# Patient Record
Sex: FEMALE | Race: WHITE | Employment: PART TIME | URBAN - METROPOLITAN AREA
[De-identification: names, ages, dates, MRNs, and addresses within clinical notes are randomized per-mention and may not be internally consistent; named-entity substitution may affect disease eponyms.]

---

## 2018-04-27 ENCOUNTER — TELEPHONE (OUTPATIENT)
Dept: NEUROLOGY | Facility: CLINIC | Age: 53
End: 2018-04-27

## 2018-04-27 NOTE — TELEPHONE ENCOUNTER
Discussed with pt appt made in error. Explained most likely it was clerical error with staff person choosing the wrong pt. She did not understand why we had her  and all other information.   Explained she was seen in [facility] in 2016 and our records

## 2019-10-24 ENCOUNTER — TELEPHONE (OUTPATIENT)
Dept: FAMILY MEDICINE CLINIC | Facility: CLINIC | Age: 54
End: 2019-10-24

## 2019-11-01 ENCOUNTER — LAB ENCOUNTER (OUTPATIENT)
Dept: LAB | Facility: REFERENCE LAB | Age: 54
End: 2019-11-01
Attending: FAMILY MEDICINE
Payer: COMMERCIAL

## 2019-11-01 ENCOUNTER — OFFICE VISIT (OUTPATIENT)
Dept: FAMILY MEDICINE CLINIC | Facility: CLINIC | Age: 54
End: 2019-11-01
Payer: COMMERCIAL

## 2019-11-01 ENCOUNTER — HOSPITAL ENCOUNTER (OUTPATIENT)
Dept: GENERAL RADIOLOGY | Age: 54
Discharge: HOME | End: 2019-11-01
Attending: FAMILY MEDICINE
Payer: COMMERCIAL

## 2019-11-01 VITALS
BODY MASS INDEX: 28.52 KG/M2 | DIASTOLIC BLOOD PRESSURE: 72 MMHG | HEIGHT: 62 IN | WEIGHT: 155 LBS | TEMPERATURE: 98 F | SYSTOLIC BLOOD PRESSURE: 122 MMHG

## 2019-11-01 DIAGNOSIS — B00.1 HERPES LABIALIS: ICD-10-CM

## 2019-11-01 DIAGNOSIS — G89.29 CHRONIC BILATERAL LOW BACK PAIN WITH BILATERAL SCIATICA: ICD-10-CM

## 2019-11-01 DIAGNOSIS — M54.41 CHRONIC BILATERAL LOW BACK PAIN WITH BILATERAL SCIATICA: ICD-10-CM

## 2019-11-01 DIAGNOSIS — M54.42 CHRONIC BILATERAL LOW BACK PAIN WITH BILATERAL SCIATICA: ICD-10-CM

## 2019-11-01 DIAGNOSIS — Z00.00 WELLNESS EXAMINATION: Primary | ICD-10-CM

## 2019-11-01 DIAGNOSIS — R23.4 BREAST SKIN CHANGES: ICD-10-CM

## 2019-11-01 DIAGNOSIS — D50.9 IRON DEFICIENCY ANEMIA, UNSPECIFIED IRON DEFICIENCY ANEMIA TYPE: ICD-10-CM

## 2019-11-01 DIAGNOSIS — Z00.00 WELLNESS EXAMINATION: ICD-10-CM

## 2019-11-01 PROCEDURE — 36415 COLL VENOUS BLD VENIPUNCTURE: CPT

## 2019-11-01 PROCEDURE — 72110 X-RAY EXAM L-2 SPINE 4/>VWS: CPT | Performed by: FAMILY MEDICINE

## 2019-11-01 PROCEDURE — 81001 URINALYSIS AUTO W/SCOPE: CPT

## 2019-11-01 PROCEDURE — 80061 LIPID PANEL: CPT

## 2019-11-01 PROCEDURE — 85025 COMPLETE CBC W/AUTO DIFF WBC: CPT

## 2019-11-01 PROCEDURE — 99396 PREV VISIT EST AGE 40-64: CPT | Performed by: FAMILY MEDICINE

## 2019-11-01 PROCEDURE — 80053 COMPREHEN METABOLIC PANEL: CPT

## 2019-11-01 RX ORDER — VALACYCLOVIR HYDROCHLORIDE 1 G/1
TABLET, FILM COATED ORAL
Qty: 21 TABLET | Refills: 0 | Status: SHIPPED | OUTPATIENT
Start: 2019-11-01 | End: 2020-03-20

## 2019-11-01 NOTE — PROGRESS NOTES
Pt lost weight since last year  She had a trip to [address] [place] [address],[address] with family in [place] and saw 3 aunts. No new family history. No new family history save aunt with dementia. 1 first cousin with leukemia. Her mom is one of 12 children.   SH:  PT IS EVELINA activity:        Days per week: Not on file        Minutes per session: Not on file      Stress: Not on file    Relationships      Social connections:        Talks on phone: Not on file        Gets together: Not on file        Attends [employer] service: No toe walk normal, strength, sensory, DTRs normal bilateral lower extremities         ASSESSMENT/PLAN:   Wellness examination  (primary encounter diagnosis)  Iron deficiency anemia, unspecified iron deficiency anemia type  Chronic bilateral low back pain wit

## 2019-11-01 NOTE — PATIENT INSTRUCTIONS
Exercise 60 minutes daily including balance exercises.   Core exercises every am.    Jeferson lilly  Look at right breast with squeezing waist (contracts pectoralis)

## 2019-11-04 ENCOUNTER — TELEPHONE (OUTPATIENT)
Dept: FAMILY MEDICINE CLINIC | Facility: CLINIC | Age: 54
End: 2019-11-04

## 2019-11-04 DIAGNOSIS — R31.29 MICROSCOPIC HEMATURIA: Primary | ICD-10-CM

## 2019-11-04 NOTE — TELEPHONE ENCOUNTER
Results of x-ray and blood tests conveyed to patient. She should be doing core exercises daily. Patient is interested in physical therapy. Will order after results of MRI obtained. Repeat UA micro secondary to small amount of blood in urine.   Patient w

## 2019-11-04 NOTE — TELEPHONE ENCOUNTER
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   Pt to increase fiber, exercise and take fish oil.  postiive small blood in urine,

## 2019-11-08 ENCOUNTER — APPOINTMENT (OUTPATIENT)
Dept: LAB | Facility: REFERENCE LAB | Age: 54
End: 2019-11-08
Attending: FAMILY MEDICINE
Payer: COMMERCIAL

## 2019-11-08 DIAGNOSIS — R31.29 MICROSCOPIC HEMATURIA: ICD-10-CM

## 2019-11-08 PROCEDURE — 81003 URINALYSIS AUTO W/O SCOPE: CPT

## 2019-11-21 ENCOUNTER — HOSPITAL ENCOUNTER (OUTPATIENT)
Dept: MRI IMAGING | Facility: HOSPITAL | Age: 54
Discharge: HOME | End: 2019-11-21
Attending: FAMILY MEDICINE
Payer: COMMERCIAL

## 2019-11-21 DIAGNOSIS — G89.29 CHRONIC BILATERAL LOW BACK PAIN WITH BILATERAL SCIATICA: ICD-10-CM

## 2019-11-21 DIAGNOSIS — M54.41 CHRONIC BILATERAL LOW BACK PAIN WITH BILATERAL SCIATICA: ICD-10-CM

## 2019-11-21 DIAGNOSIS — M54.42 CHRONIC BILATERAL LOW BACK PAIN WITH BILATERAL SCIATICA: ICD-10-CM

## 2019-11-21 PROCEDURE — 72148 MRI LUMBAR SPINE W/O DYE: CPT | Performed by: FAMILY MEDICINE

## 2019-11-26 ENCOUNTER — TELEPHONE (OUTPATIENT)
Dept: FAMILY MEDICINE CLINIC | Facility: CLINIC | Age: 54
End: 2019-11-26

## 2019-11-26 NOTE — TELEPHONE ENCOUNTER
MRI reveals diffuse disc bulge at L5-S1 with mild right neuroforaminal stenosis, lesser so left neuroforaminal stenosis. Discussed with patient. Will try physical therapy for core stabilization and pain control.   If not better or any worse we will consid

## 2020-03-06 ENCOUNTER — TELEPHONE (OUTPATIENT)
Dept: FAMILY MEDICINE CLINIC | Facility: CLINIC | Age: 55
End: 2020-03-06

## 2020-03-06 NOTE — TELEPHONE ENCOUNTER
Pt stating she started to have shoulder blade pain yesterday and last night she needed her husbands help to get into her PJ's due to the pain.   Today pt took 2 Aleve and that didn't help as much, then she used a heating pad and that did help some then she

## 2020-03-20 ENCOUNTER — LAB ENCOUNTER (OUTPATIENT)
Dept: LAB | Facility: REFERENCE LAB | Age: 55
End: 2020-03-20
Attending: FAMILY MEDICINE
Payer: COMMERCIAL

## 2020-03-20 ENCOUNTER — TELEPHONE (OUTPATIENT)
Dept: FAMILY MEDICINE CLINIC | Facility: CLINIC | Age: 55
End: 2020-03-20

## 2020-03-20 ENCOUNTER — HOSPITAL ENCOUNTER (OUTPATIENT)
Dept: GENERAL RADIOLOGY | Age: 55
Discharge: HOME | End: 2020-03-20
Attending: FAMILY MEDICINE
Payer: COMMERCIAL

## 2020-03-20 ENCOUNTER — OFFICE VISIT (OUTPATIENT)
Dept: FAMILY MEDICINE CLINIC | Facility: CLINIC | Age: 55
End: 2020-03-20
Payer: COMMERCIAL

## 2020-03-20 VITALS
HEART RATE: 84 BPM | OXYGEN SATURATION: 99 % | TEMPERATURE: 98 F | HEIGHT: 62 IN | BODY MASS INDEX: 28.89 KG/M2 | DIASTOLIC BLOOD PRESSURE: 76 MMHG | SYSTOLIC BLOOD PRESSURE: 124 MMHG | WEIGHT: 157 LBS

## 2020-03-20 DIAGNOSIS — D64.9 ANEMIA, UNSPECIFIED TYPE: ICD-10-CM

## 2020-03-20 DIAGNOSIS — L59.0: ICD-10-CM

## 2020-03-20 DIAGNOSIS — R59.0 SUPRACLAVICULAR ADENOPATHY: ICD-10-CM

## 2020-03-20 DIAGNOSIS — D64.9 ANEMIA, UNSPECIFIED TYPE: Primary | ICD-10-CM

## 2020-03-20 DIAGNOSIS — E55.9 VITAMIN D DEFICIENCY: ICD-10-CM

## 2020-03-20 DIAGNOSIS — R53.83 MALAISE AND FATIGUE: ICD-10-CM

## 2020-03-20 DIAGNOSIS — R53.81 MALAISE AND FATIGUE: ICD-10-CM

## 2020-03-20 DIAGNOSIS — M25.50 ARTHRALGIA, UNSPECIFIED JOINT: ICD-10-CM

## 2020-03-20 DIAGNOSIS — D75.89 MACROCYTOSIS: ICD-10-CM

## 2020-03-20 DIAGNOSIS — S04.892A: Primary | ICD-10-CM

## 2020-03-20 LAB
25(OH)D3 SERPL-MCNC: 34.3 NG/ML (ref 30–100)
ALBUMIN SERPL-MCNC: 3.4 G/DL (ref 3.4–5)
ALBUMIN/GLOB SERPL: 1 {RATIO} (ref 1–2)
ALP LIVER SERPL-CCNC: 83 U/L (ref 41–108)
ALT SERPL-CCNC: 19 U/L (ref 13–56)
ANION GAP SERPL CALC-SCNC: 5 MMOL/L (ref 0–18)
AST SERPL-CCNC: 17 U/L (ref 15–37)
BASOPHILS # BLD AUTO: 0.05 X10(3) UL (ref 0–0.2)
BASOPHILS NFR BLD AUTO: 0.9 %
BILIRUB SERPL-MCNC: 1.1 MG/DL (ref 0.1–2)
BUN BLD-MCNC: 15 MG/DL (ref 7–18)
BUN/CREAT SERPL: 17.6 (ref 10–20)
CALCIUM BLD-MCNC: 8.8 MG/DL (ref 8.5–10.1)
CHLORIDE SERPL-SCNC: 108 MMOL/L (ref 98–112)
CO2 SERPL-SCNC: 27 MMOL/L (ref 21–32)
CREAT BLD-MCNC: 0.85 MG/DL (ref 0.55–1.02)
CRP SERPL-MCNC: 0.38 MG/DL (ref ?–0.3)
DEPRECATED RDW RBC AUTO: 70.3 FL (ref 35.1–46.3)
EOSINOPHIL # BLD AUTO: 0.15 X10(3) UL (ref 0–0.7)
EOSINOPHIL NFR BLD AUTO: 2.7 %
ERYTHROCYTE [DISTWIDTH] IN BLOOD BY AUTOMATED COUNT: 19.8 % (ref 11–15)
ERYTHROCYTE [SEDIMENTATION RATE] IN BLOOD: 36 MM/HR (ref 0–30)
FOLATE SERPL-MCNC: 13.3 NG/ML (ref 8.7–?)
GLOBULIN PLAS-MCNC: 3.3 G/DL (ref 2.8–4.4)
GLUCOSE BLD-MCNC: 99 MG/DL (ref 70–99)
HCT VFR BLD AUTO: 24.5 % (ref 35–48)
HGB BLD-MCNC: 8.5 G/DL (ref 12–16)
IMM GRANULOCYTES # BLD AUTO: 0.04 X10(3) UL (ref 0–1)
IMM GRANULOCYTES NFR BLD: 0.7 %
IRON SATURATION: 81 % (ref 15–50)
IRON SERPL-MCNC: 183 UG/DL (ref 50–170)
LYMPHOCYTES # BLD AUTO: 1.52 X10(3) UL (ref 1–4)
LYMPHOCYTES NFR BLD AUTO: 27.8 %
M PROTEIN MFR SERPL ELPH: 6.7 G/DL (ref 6.4–8.2)
MCH RBC QN AUTO: 35.1 PG (ref 26–34)
MCHC RBC AUTO-ENTMCNC: 34.7 G/DL (ref 31–37)
MCV RBC AUTO: 101.2 FL (ref 80–100)
MONOCYTES # BLD AUTO: 0.47 X10(3) UL (ref 0.1–1)
MONOCYTES NFR BLD AUTO: 8.6 %
NEUTROPHILS # BLD AUTO: 3.23 X10 (3) UL (ref 1.5–7.7)
NEUTROPHILS # BLD AUTO: 3.23 X10(3) UL (ref 1.5–7.7)
NEUTROPHILS NFR BLD AUTO: 59.3 %
OSMOLALITY SERPL CALC.SUM OF ELEC: 291 MOSM/KG (ref 275–295)
PATIENT FASTING Y/N/NP: NO
PLATELET # BLD AUTO: 215 10(3)UL (ref 150–450)
PLATELET MORPHOLOGY: NORMAL
POTASSIUM SERPL-SCNC: 4.3 MMOL/L (ref 3.5–5.1)
RBC # BLD AUTO: 2.42 X10(6)UL (ref 3.8–5.3)
SODIUM SERPL-SCNC: 140 MMOL/L (ref 136–145)
T4 FREE SERPL-MCNC: 1.1 NG/DL (ref 0.8–1.7)
TOTAL IRON BINDING CAPACITY: 226 UG/DL (ref 240–450)
TRANSFERRIN SERPL-MCNC: 152 MG/DL (ref 200–360)
TSI SER-ACNC: 1.48 MIU/ML (ref 0.36–3.74)
VIT B12 SERPL-MCNC: 864 PG/ML (ref 193–986)
WBC # BLD AUTO: 5.5 X10(3) UL (ref 4–11)

## 2020-03-20 PROCEDURE — 80053 COMPREHEN METABOLIC PANEL: CPT

## 2020-03-20 PROCEDURE — 99214 OFFICE O/P EST MOD 30 MIN: CPT | Performed by: FAMILY MEDICINE

## 2020-03-20 PROCEDURE — 36415 COLL VENOUS BLD VENIPUNCTURE: CPT

## 2020-03-20 PROCEDURE — 82746 ASSAY OF FOLIC ACID SERUM: CPT

## 2020-03-20 PROCEDURE — 86038 ANTINUCLEAR ANTIBODIES: CPT

## 2020-03-20 PROCEDURE — 84466 ASSAY OF TRANSFERRIN: CPT

## 2020-03-20 PROCEDURE — 84443 ASSAY THYROID STIM HORMONE: CPT

## 2020-03-20 PROCEDURE — 82306 VITAMIN D 25 HYDROXY: CPT

## 2020-03-20 PROCEDURE — 71046 X-RAY EXAM CHEST 2 VIEWS: CPT | Performed by: FAMILY MEDICINE

## 2020-03-20 PROCEDURE — 85652 RBC SED RATE AUTOMATED: CPT

## 2020-03-20 PROCEDURE — 86140 C-REACTIVE PROTEIN: CPT

## 2020-03-20 PROCEDURE — 84439 ASSAY OF FREE THYROXINE: CPT

## 2020-03-20 PROCEDURE — 85025 COMPLETE CBC W/AUTO DIFF WBC: CPT

## 2020-03-20 PROCEDURE — 83540 ASSAY OF IRON: CPT

## 2020-03-20 PROCEDURE — 82607 VITAMIN B-12: CPT

## 2020-03-20 RX ORDER — CHLORAL HYDRATE 500 MG
CAPSULE ORAL
COMMUNITY
End: 2021-08-20

## 2020-03-20 RX ORDER — OMEPRAZOLE 40 MG/1
40 CAPSULE, DELAYED RELEASE ORAL DAILY
Qty: 90 CAPSULE | Refills: 3 | Status: SHIPPED | OUTPATIENT
Start: 2020-03-20 | End: 2020-07-30

## 2020-03-20 RX ORDER — MULTIVITAMIN
TABLET ORAL
COMMUNITY
End: 2020-07-30

## 2020-03-20 RX ORDER — MECLIZINE HCL 12.5 MG/1
TABLET ORAL
COMMUNITY
End: 2020-07-30

## 2020-03-20 RX ORDER — ALPRAZOLAM 0.5 MG/1
0.5 TABLET ORAL EVERY 6 HOURS PRN
Qty: 10 TABLET | Refills: 0 | Status: SHIPPED | OUTPATIENT
Start: 2020-03-20 | End: 2020-07-30

## 2020-03-20 NOTE — PROGRESS NOTES
HPI:    Patient ID: Rosa Mendoza is a 48year old female who presents for exhaustion beginning  Late Nov 2019, and left sided neck, shoulder, inbetween shoulders, midback pain, and swollen lymph nodes left side of neck.   No problems smelling tasting or he depressed.   TMs/EACs normal  Neck supple with shotty anterior and posterior cervical adenopathy, nontender  Positive shoddy left supraclavicular adenopathy  Heart regular rate and rhythm without murmur  Lungs clear to auscultation  Abdomen soft without vis 0     Sig: Take 1 tablet (0.5 mg total) by mouth every 6 (six) hours as needed.        Imaging & Referrals:  MRI BRAIN (W+WO) (CPT=70553)  XR CHEST PA + LAT CHEST (FSQ=63530)       QUIN CARNES,     This note was partially prepared using [facility]

## 2020-03-20 NOTE — TELEPHONE ENCOUNTER
Spoke with patient via phone  Chest x-ray normal  Compact normal  CBC significant for new macrocytic anemia  We will start proton pump inhibitor secondary to signs of early satiety and will have patient return tomorrow for rectal exam and stool for guaiac.

## 2020-03-21 ENCOUNTER — OFFICE VISIT (OUTPATIENT)
Dept: FAMILY MEDICINE CLINIC | Facility: CLINIC | Age: 55
End: 2020-03-21
Payer: COMMERCIAL

## 2020-03-21 VITALS
SYSTOLIC BLOOD PRESSURE: 124 MMHG | DIASTOLIC BLOOD PRESSURE: 80 MMHG | HEIGHT: 62 IN | WEIGHT: 157 LBS | HEART RATE: 70 BPM | OXYGEN SATURATION: 98 % | TEMPERATURE: 98 F | BODY MASS INDEX: 28.89 KG/M2

## 2020-03-21 DIAGNOSIS — R53.83 MALAISE AND FATIGUE: ICD-10-CM

## 2020-03-21 DIAGNOSIS — R51.9 NOCTURNAL HEADACHES: ICD-10-CM

## 2020-03-21 DIAGNOSIS — R53.81 MALAISE AND FATIGUE: ICD-10-CM

## 2020-03-21 DIAGNOSIS — D53.9 ANEMIA, MACROCYTIC: Primary | ICD-10-CM

## 2020-03-21 DIAGNOSIS — R68.81 EARLY SATIETY: ICD-10-CM

## 2020-03-21 PROCEDURE — 99213 OFFICE O/P EST LOW 20 MIN: CPT | Performed by: FAMILY MEDICINE

## 2020-03-21 NOTE — PROGRESS NOTES
HPI:    Patient ID: Suzi Land is a 48year old female who presents for follow-up of new macrocytic anemia. Patient found to have hemoglobin of 8.5. Hemoglobin was normal November 2019.   Initial lab tests reveal normal iron studies, normal B12, folate Fear of current or ex partner: Not on file        Emotionally abused: Not on file        Physically abused: Not on file        Forced sexual activity: Not on file    Other Topics      Concerns:        Caffeine Concern: Not Asked        Exercise: Not Asked given copy of lab results and referral for hematology  Any worsening weakness patient to go to ER. 2. Nocturnal headaches consistent with sinusitis with postnasal drip  3. Early satiety without weight loss  4.  Malaise and fatigue         Meds This Visit

## 2020-03-23 ENCOUNTER — LAB ENCOUNTER (OUTPATIENT)
Dept: LAB | Facility: REFERENCE LAB | Age: 55
End: 2020-03-23
Attending: FAMILY MEDICINE
Payer: COMMERCIAL

## 2020-03-23 ENCOUNTER — TELEPHONE (OUTPATIENT)
Dept: FAMILY MEDICINE CLINIC | Facility: CLINIC | Age: 55
End: 2020-03-23

## 2020-03-23 ENCOUNTER — TELEPHONE (OUTPATIENT)
Dept: HEMATOLOGY/ONCOLOGY | Facility: HOSPITAL | Age: 55
End: 2020-03-23

## 2020-03-23 DIAGNOSIS — D53.9 ANEMIA, MACROCYTIC: ICD-10-CM

## 2020-03-23 DIAGNOSIS — D64.9 ANEMIA, UNSPECIFIED TYPE: ICD-10-CM

## 2020-03-23 LAB
BASOPHILS # BLD AUTO: 0.05 X10(3) UL (ref 0–0.2)
BASOPHILS NFR BLD AUTO: 1.2 %
DEPRECATED RDW RBC AUTO: 73 FL (ref 35.1–46.3)
EOSINOPHIL # BLD AUTO: 0.15 X10(3) UL (ref 0–0.7)
EOSINOPHIL NFR BLD AUTO: 3.5 %
ERYTHROCYTE [DISTWIDTH] IN BLOOD BY AUTOMATED COUNT: 20 % (ref 11–15)
HCT VFR BLD AUTO: 23.8 % (ref 35–48)
HGB BLD-MCNC: 8.1 G/DL (ref 12–16)
HGB RETIC QN AUTO: 37.8 PG (ref 28.2–36.6)
IMM GRANULOCYTES # BLD AUTO: 0.02 X10(3) UL (ref 0–1)
IMM GRANULOCYTES NFR BLD: 0.5 %
IMM RETICS NFR: 0.27 RATIO (ref 0.1–0.3)
LDH SERPL L TO P-CCNC: 375 U/L
LYMPHOCYTES # BLD AUTO: 1.09 X10(3) UL (ref 1–4)
LYMPHOCYTES NFR BLD AUTO: 25.2 %
MCH RBC QN AUTO: 35.2 PG (ref 26–34)
MCHC RBC AUTO-ENTMCNC: 34 G/DL (ref 31–37)
MCV RBC AUTO: 103.5 FL (ref 80–100)
MONOCYTES # BLD AUTO: 0.42 X10(3) UL (ref 0.1–1)
MONOCYTES NFR BLD AUTO: 9.7 %
NEUTROPHILS # BLD AUTO: 2.6 X10 (3) UL (ref 1.5–7.7)
NEUTROPHILS # BLD AUTO: 2.6 X10(3) UL (ref 1.5–7.7)
NEUTROPHILS NFR BLD AUTO: 59.9 %
NUCLEAR IGG TITR SER IF: NEGATIVE {TITER}
PLATELET # BLD AUTO: 214 10(3)UL (ref 150–450)
PLATELET MORPHOLOGY: NORMAL
RBC # BLD AUTO: 2.3 X10(6)UL (ref 3.8–5.3)
RETICS # AUTO: 382 X10(3) UL (ref 22.5–147.5)
RETICS/RBC NFR AUTO: 16.6 % (ref 0.5–2.5)
WBC # BLD AUTO: 4.3 X10(3) UL (ref 4–11)

## 2020-03-23 PROCEDURE — 83615 LACTATE (LD) (LDH) ENZYME: CPT

## 2020-03-23 PROCEDURE — 85025 COMPLETE CBC W/AUTO DIFF WBC: CPT

## 2020-03-23 PROCEDURE — 85045 AUTOMATED RETICULOCYTE COUNT: CPT

## 2020-03-23 PROCEDURE — 36415 COLL VENOUS BLD VENIPUNCTURE: CPT

## 2020-03-23 NOTE — TELEPHONE ENCOUNTER
Phone call with patient. Patient's hemoglobin is now 8.1 with MCV of 105. Reticulocyte count is appropriate for level of anemia. LDH is elevated. Phoned oncology and they will see her tomorrow. Patient expects a call from oncology for details.   Recomm

## 2020-03-24 ENCOUNTER — APPOINTMENT (OUTPATIENT)
Dept: HEMATOLOGY/ONCOLOGY | Facility: HOSPITAL | Age: 55
End: 2020-03-24
Attending: INTERNAL MEDICINE
Payer: COMMERCIAL

## 2020-03-25 ENCOUNTER — TELEPHONE (OUTPATIENT)
Dept: FAMILY MEDICINE CLINIC | Facility: CLINIC | Age: 55
End: 2020-03-25

## 2020-03-25 NOTE — TELEPHONE ENCOUNTER
Pt saw Dr John Vazquez, she is scheduled for CT neck/chest/abd/pelvis today. Dr John Vazquez is part of [facility],   Will go for MRI of brain. Will f/u with result.   [address]

## 2020-03-26 ENCOUNTER — TELEPHONE (OUTPATIENT)
Dept: FAMILY MEDICINE CLINIC | Facility: CLINIC | Age: 55
End: 2020-03-26

## 2020-03-26 NOTE — TELEPHONE ENCOUNTER
Per pt wanted to verify that care notes and labs results from Dr. Reynolds Pore office is being sent to Dr. Richard Gage. Provided pt with fax # to send in information.

## 2020-03-26 NOTE — TELEPHONE ENCOUNTER
Pt is wondering if ct scan was faxed over from Dr Ashley Pimentel office they told her they would fax it last night pt had ct scan at [facility]

## 2020-03-27 ENCOUNTER — TELEPHONE (OUTPATIENT)
Dept: FAMILY MEDICINE CLINIC | Facility: CLINIC | Age: 55
End: 2020-03-27

## 2020-03-27 DIAGNOSIS — D64.9 ANEMIA, UNSPECIFIED TYPE: Primary | ICD-10-CM

## 2020-03-27 DIAGNOSIS — D75.89 MACROCYTOSIS: ICD-10-CM

## 2020-03-27 NOTE — TELEPHONE ENCOUNTER
Phone call with patient. Patient received information that we had CT result and we do not. Patient told by oncology that she has a thickened stomach wall. She is to make appointment with GI at [facility].   Awaiting CAT scan result for official

## 2020-03-27 NOTE — TELEPHONE ENCOUNTER
CT of neck, abd, chest and pelvis normal save:  Diffuse thickening of distal stomach  Small cervical nodes, too small to require further w/u, unless changes. Pt told to start PPI 4 days ago, still recommend.   To GI, given referral last year for age rela

## 2020-03-27 NOTE — TELEPHONE ENCOUNTER
Patient calling about CT results. Patient aware we do not have CT results but patient does need to talk to Dr. Thiago Miner about what Dr. Bernadette Nicholson told her to talk to Dr. Thiago Miner about.

## 2020-03-28 ENCOUNTER — LAB ENCOUNTER (OUTPATIENT)
Dept: LAB | Facility: REFERENCE LAB | Age: 55
End: 2020-03-28
Attending: FAMILY MEDICINE
Payer: COMMERCIAL

## 2020-03-28 DIAGNOSIS — D75.89 MACROCYTOSIS: ICD-10-CM

## 2020-03-28 DIAGNOSIS — D64.9 ANEMIA, UNSPECIFIED TYPE: ICD-10-CM

## 2020-03-28 LAB
BASOPHILS # BLD AUTO: 0.05 X10(3) UL (ref 0–0.2)
BASOPHILS NFR BLD AUTO: 1.1 %
DEPRECATED RDW RBC AUTO: 76.5 FL (ref 35.1–46.3)
EOSINOPHIL # BLD AUTO: 0.13 X10(3) UL (ref 0–0.7)
EOSINOPHIL NFR BLD AUTO: 2.9 %
ERYTHROCYTE [DISTWIDTH] IN BLOOD BY AUTOMATED COUNT: 19.3 % (ref 11–15)
HCT VFR BLD AUTO: 26.3 % (ref 35–48)
HGB BLD-MCNC: 8.7 G/DL (ref 12–16)
IMM GRANULOCYTES # BLD AUTO: 0.01 X10(3) UL (ref 0–1)
IMM GRANULOCYTES NFR BLD: 0.2 %
LYMPHOCYTES # BLD AUTO: 1.14 X10(3) UL (ref 1–4)
LYMPHOCYTES NFR BLD AUTO: 25.3 %
MCH RBC QN AUTO: 35.8 PG (ref 26–34)
MCHC RBC AUTO-ENTMCNC: 33.1 G/DL (ref 31–37)
MCV RBC AUTO: 108.2 FL (ref 80–100)
MONOCYTES # BLD AUTO: 0.37 X10(3) UL (ref 0.1–1)
MONOCYTES NFR BLD AUTO: 8.2 %
NEUTROPHILS # BLD AUTO: 2.81 X10 (3) UL (ref 1.5–7.7)
NEUTROPHILS # BLD AUTO: 2.81 X10(3) UL (ref 1.5–7.7)
NEUTROPHILS NFR BLD AUTO: 62.3 %
PLATELET # BLD AUTO: 252 10(3)UL (ref 150–450)
PLATELET MORPHOLOGY: NORMAL
RBC # BLD AUTO: 2.43 X10(6)UL (ref 3.8–5.3)
WBC # BLD AUTO: 4.5 X10(3) UL (ref 4–11)

## 2020-03-28 PROCEDURE — 85025 COMPLETE CBC W/AUTO DIFF WBC: CPT

## 2020-03-28 PROCEDURE — 36415 COLL VENOUS BLD VENIPUNCTURE: CPT

## 2020-03-30 ENCOUNTER — TELEPHONE (OUTPATIENT)
Dept: FAMILY MEDICINE CLINIC | Facility: CLINIC | Age: 55
End: 2020-03-30

## 2020-05-14 ENCOUNTER — TELEPHONE (OUTPATIENT)
Dept: FAMILY MEDICINE CLINIC | Facility: CLINIC | Age: 55
End: 2020-05-14

## 2020-05-14 NOTE — TELEPHONE ENCOUNTER
Pt is needing to speak to nurse or Dr Kaycee Sanders about a referral she's needing and didn't want to discuss it with me   Call back

## 2020-05-14 NOTE — TELEPHONE ENCOUNTER
Pt states she likes her current hematologist but would like a second opinion as \"they haven't been able to find out yet what's wrong with me\".  Pt states she is scheduled to see Dr. Scooby Maynard at [facility] on 9/11/2020 but is wondering if [address] [facility] can call Dr. eSjal Peterson Locations     Number of Visits: 1     ________________________________________________________________  Scheduling Information:   To schedule a test at [facility], [employer] Scheduling at [phone], Monday through [place]

## 2020-05-15 ENCOUNTER — TELEPHONE (OUTPATIENT)
Dept: FAMILY MEDICINE CLINIC | Facility: CLINIC | Age: 55
End: 2020-05-15

## 2020-05-15 PROBLEM — D53.9 MACROCYTIC ANEMIA: Status: ACTIVE | Noted: 2020-03-21

## 2020-05-15 NOTE — TELEPHONE ENCOUNTER
Uday Youssef, DO      5/15/20 10:08 AM   Note      Patient is quite upset that she is being charged for a bone marrow biopsy that she never underwent. Overall she feels good without any bleeding from her nose or gums and no weakness.   She is scheduled to

## 2020-05-15 NOTE — TELEPHONE ENCOUNTER
Patient is quite upset that she is being charged for a bone marrow biopsy that she never underwent. Overall she feels good without any bleeding from her nose or gums and no weakness.   She is scheduled to see oncology at [facility] this week and to have her bl

## 2020-06-12 ENCOUNTER — TELEPHONE (OUTPATIENT)
Dept: FAMILY MEDICINE CLINIC | Facility: CLINIC | Age: 55
End: 2020-06-12

## 2020-06-12 NOTE — TELEPHONE ENCOUNTER
Pt inquiring if CT scan from 3/25/2020 was received by [facility]. This RN confirmed it, offered to leave copy for pt for  from office since it can't be sent via [employer]. Pt stated she contacted [facility] who will fax test results to pt. All of pt's questions answered to pt's satisfaction.

## 2020-07-30 ENCOUNTER — OFFICE VISIT (OUTPATIENT)
Dept: FAMILY MEDICINE CLINIC | Facility: CLINIC | Age: 55
End: 2020-07-30
Payer: COMMERCIAL

## 2020-07-30 ENCOUNTER — HOSPITAL ENCOUNTER (OUTPATIENT)
Dept: GENERAL RADIOLOGY | Age: 55
Discharge: HOME | End: 2020-07-30
Attending: FAMILY MEDICINE
Payer: COMMERCIAL

## 2020-07-30 VITALS
WEIGHT: 158 LBS | DIASTOLIC BLOOD PRESSURE: 84 MMHG | HEIGHT: 62 IN | TEMPERATURE: 99 F | OXYGEN SATURATION: 98 % | SYSTOLIC BLOOD PRESSURE: 122 MMHG | HEART RATE: 71 BPM | BODY MASS INDEX: 29.08 KG/M2

## 2020-07-30 DIAGNOSIS — M54.2 NECK PAIN: ICD-10-CM

## 2020-07-30 DIAGNOSIS — G47.00 INSOMNIA, UNSPECIFIED TYPE: ICD-10-CM

## 2020-07-30 DIAGNOSIS — M54.2 NECK PAIN: Primary | ICD-10-CM

## 2020-07-30 DIAGNOSIS — N95.1 HOT FLASHES DUE TO MENOPAUSE: ICD-10-CM

## 2020-07-30 PROCEDURE — 3074F SYST BP LT 130 MM HG: CPT | Performed by: FAMILY MEDICINE

## 2020-07-30 PROCEDURE — 3079F DIAST BP 80-89 MM HG: CPT | Performed by: FAMILY MEDICINE

## 2020-07-30 PROCEDURE — 99213 OFFICE O/P EST LOW 20 MIN: CPT | Performed by: FAMILY MEDICINE

## 2020-07-30 PROCEDURE — 72050 X-RAY EXAM NECK SPINE 4/5VWS: CPT | Performed by: FAMILY MEDICINE

## 2020-07-30 PROCEDURE — 3008F BODY MASS INDEX DOCD: CPT | Performed by: FAMILY MEDICINE

## 2020-07-30 RX ORDER — FOLIC ACID 1 MG/1
1 TABLET ORAL DAILY
COMMUNITY

## 2020-07-30 RX ORDER — ESTRADIOL 0.1 MG/G
CREAM VAGINAL
Qty: 1 TUBE | Refills: 2 | Status: SHIPPED | OUTPATIENT
Start: 2020-07-30 | End: 2020-11-10

## 2020-07-30 RX ORDER — IBUPROFEN 800 MG
TABLET ORAL DAILY
COMMUNITY

## 2020-07-30 RX ORDER — ZOLPIDEM TARTRATE 5 MG/1
5 TABLET ORAL NIGHTLY PRN
Qty: 30 TABLET | Refills: 0 | Status: SHIPPED | OUTPATIENT
Start: 2020-07-30 | End: 2020-11-27

## 2020-07-30 NOTE — PROGRESS NOTES
HPI:    Patient ID: Laura Cameron is a 48year old female who presents ftor anxiety. She has terrible insomnia, associated with menapause. She has hot flashes. during the night. It will wake her up, then she cannot get to sleep.   She takes drops, they he for:  chest pain, palpitations, orthopnea  GI: Negative for:  abdominal pain, nausea, vomiting, diarrhea, constipation, bleeding  Neurology: Negative for: headache, numbness, weakness,   Genito-Urinary: Negative for: dysuria, frequency  Psychiatric: Horace Middleton Prescriptions Disp Refills   • zolpidem 5 MG Oral Tab 30 tablet 0     Sig: Take 1 tablet (5 mg total) by mouth nightly as needed for Sleep. • Estradiol 0.1 MG/GM Vaginal Cream 1 Tube 2     Sig: Insert every night for 7 days week 1.   Week to 6 days out of

## 2020-07-31 ENCOUNTER — TELEPHONE (OUTPATIENT)
Dept: FAMILY MEDICINE CLINIC | Facility: CLINIC | Age: 55
End: 2020-07-31

## 2020-07-31 NOTE — TELEPHONE ENCOUNTER
Discussed results of x-ray of spine with patient. Patient does have anterior spurs that could be contributing to the feeling in the front of her neck. Recommend excellent posture.

## 2020-09-19 DIAGNOSIS — D59.10 AUTOIMMUNE HEMOLYTIC ANEMIA (HCC): Primary | ICD-10-CM

## 2020-09-22 ENCOUNTER — LAB ENCOUNTER (OUTPATIENT)
Dept: LAB | Age: 55
End: 2020-09-22
Attending: FAMILY MEDICINE
Payer: COMMERCIAL

## 2020-09-22 ENCOUNTER — TELEPHONE (OUTPATIENT)
Dept: FAMILY MEDICINE CLINIC | Facility: CLINIC | Age: 55
End: 2020-09-22

## 2020-09-22 DIAGNOSIS — D59.10 AUTOIMMUNE HEMOLYTIC ANEMIA (HCC): ICD-10-CM

## 2020-09-22 LAB
BASOPHILS # BLD AUTO: 0.01 X10(3) UL (ref 0–0.2)
BASOPHILS NFR BLD AUTO: 0.1 %
DEPRECATED RDW RBC AUTO: 51.6 FL (ref 35.1–46.3)
DIRECT COOMBS POLY: NEGATIVE
EOSINOPHIL # BLD AUTO: 0.01 X10(3) UL (ref 0–0.7)
EOSINOPHIL NFR BLD AUTO: 0.1 %
ERYTHROCYTE [DISTWIDTH] IN BLOOD BY AUTOMATED COUNT: 13.2 % (ref 11–15)
HCT VFR BLD AUTO: 39.3 %
HGB BLD-MCNC: 13 G/DL
HGB RETIC QN AUTO: 38.2 PG (ref 28.2–36.6)
IMM GRANULOCYTES # BLD AUTO: 0.02 X10(3) UL (ref 0–1)
IMM GRANULOCYTES NFR BLD: 0.3 %
IMM RETICS NFR: 0.07 RATIO (ref 0.1–0.3)
LDH SERPL L TO P-CCNC: 218 U/L
LYMPHOCYTES # BLD AUTO: 0.66 X10(3) UL (ref 1–4)
LYMPHOCYTES NFR BLD AUTO: 8.6 %
MCH RBC QN AUTO: 34.9 PG (ref 26–34)
MCHC RBC AUTO-ENTMCNC: 33.1 G/DL (ref 31–37)
MCV RBC AUTO: 105.6 FL
MONOCYTES # BLD AUTO: 0.37 X10(3) UL (ref 0.1–1)
MONOCYTES NFR BLD AUTO: 4.8 %
NEUTROPHILS # BLD AUTO: 6.6 X10 (3) UL (ref 1.5–7.7)
NEUTROPHILS # BLD AUTO: 6.6 X10(3) UL (ref 1.5–7.7)
NEUTROPHILS NFR BLD AUTO: 86.1 %
PLATELET # BLD AUTO: 308 10(3)UL (ref 150–450)
RBC # BLD AUTO: 3.72 X10(6)UL
RETICS # AUTO: 97.8 X10(3) UL (ref 22.5–147.5)
RETICS/RBC NFR AUTO: 2.6 %
WBC # BLD AUTO: 7.7 X10(3) UL (ref 4–11)

## 2020-09-22 PROCEDURE — 85045 AUTOMATED RETICULOCYTE COUNT: CPT

## 2020-09-22 PROCEDURE — 36415 COLL VENOUS BLD VENIPUNCTURE: CPT

## 2020-09-22 PROCEDURE — 86880 COOMBS TEST DIRECT: CPT

## 2020-09-22 PROCEDURE — 85025 COMPLETE CBC W/AUTO DIFF WBC: CPT

## 2020-09-22 PROCEDURE — 83615 LACTATE (LD) (LDH) ENZYME: CPT

## 2020-09-22 NOTE — TELEPHONE ENCOUNTER
Called pt and wants test results faxed to provider to titrate prednisone. Informed pt that Dr. Saray Buck needs to review results before faxing to another provider. Pt verbalized understanding.

## 2020-09-23 NOTE — TELEPHONE ENCOUNTER
Devaughn Major, DO  You 15 hours ago (4:36 PM)     Okay to fax results. Message text      Spoke with Kamini Mckeon MA and results faxed to Dr. Sheron Archer.

## 2020-11-10 ENCOUNTER — OFFICE VISIT (OUTPATIENT)
Dept: FAMILY MEDICINE CLINIC | Facility: CLINIC | Age: 55
End: 2020-11-10
Payer: COMMERCIAL

## 2020-11-10 VITALS
HEART RATE: 77 BPM | TEMPERATURE: 98 F | HEIGHT: 62 IN | BODY MASS INDEX: 30 KG/M2 | SYSTOLIC BLOOD PRESSURE: 118 MMHG | OXYGEN SATURATION: 98 % | DIASTOLIC BLOOD PRESSURE: 76 MMHG | WEIGHT: 163 LBS

## 2020-11-10 DIAGNOSIS — Z00.00 WELLNESS EXAMINATION: ICD-10-CM

## 2020-11-10 DIAGNOSIS — R53.81 MALAISE AND FATIGUE: ICD-10-CM

## 2020-11-10 DIAGNOSIS — R51.9 NOCTURNAL HEADACHES: ICD-10-CM

## 2020-11-10 DIAGNOSIS — E27.40 ADRENAL INSUFFICIENCY (HCC): ICD-10-CM

## 2020-11-10 DIAGNOSIS — R53.83 MALAISE AND FATIGUE: ICD-10-CM

## 2020-11-10 DIAGNOSIS — E55.9 VITAMIN D DEFICIENCY: ICD-10-CM

## 2020-11-10 DIAGNOSIS — D53.9 MACROCYTIC ANEMIA: Primary | ICD-10-CM

## 2020-11-10 PROCEDURE — 99396 PREV VISIT EST AGE 40-64: CPT | Performed by: FAMILY MEDICINE

## 2020-11-10 PROCEDURE — 3074F SYST BP LT 130 MM HG: CPT | Performed by: FAMILY MEDICINE

## 2020-11-10 PROCEDURE — 3008F BODY MASS INDEX DOCD: CPT | Performed by: FAMILY MEDICINE

## 2020-11-10 PROCEDURE — 99072 ADDL SUPL MATRL&STAF TM PHE: CPT | Performed by: FAMILY MEDICINE

## 2020-11-10 PROCEDURE — 3078F DIAST BP <80 MM HG: CPT | Performed by: FAMILY MEDICINE

## 2020-11-10 NOTE — PROGRESS NOTES
HPI:    Patient ID: Ezra Resendiz is a 50year old female who presents for routine physical.  She is being followed by heme onc for an anemia, macrocytic.   She was started on prednisone 60mg and this was gradually tapered to 0.5 mg with Last prednisone O Fear of current or ex partner: Not on file        Emotionally abused: Not on file        Physically abused: Not on file        Forced sexual activity: Not on file    Other Topics      Concerns:        Caffeine Concern: Not Asked        Exercise: Not Asked Wt 163 lb (73.9 kg)   SpO2 98%   BMI 29.81 kg/m²     PHYSICAL EXAM:   Physical Exam  Vital signs stable, afebrile, alert and oriented x4 no acute distress  Head normocephalic, extraocular muscle intact, pupils equal round and reactive to light  Nasal muc

## 2020-11-11 ENCOUNTER — LAB ENCOUNTER (OUTPATIENT)
Dept: LAB | Age: 55
End: 2020-11-11
Attending: FAMILY MEDICINE
Payer: COMMERCIAL

## 2020-11-11 DIAGNOSIS — E55.9 VITAMIN D DEFICIENCY: ICD-10-CM

## 2020-11-11 DIAGNOSIS — Z00.00 WELLNESS EXAMINATION: ICD-10-CM

## 2020-11-11 DIAGNOSIS — E27.40 ADRENAL INSUFFICIENCY (HCC): ICD-10-CM

## 2020-11-11 DIAGNOSIS — D53.9 MACROCYTIC ANEMIA: ICD-10-CM

## 2020-11-11 PROCEDURE — 80061 LIPID PANEL: CPT

## 2020-11-11 PROCEDURE — 36415 COLL VENOUS BLD VENIPUNCTURE: CPT

## 2020-11-11 PROCEDURE — 80053 COMPREHEN METABOLIC PANEL: CPT

## 2020-11-11 PROCEDURE — 84439 ASSAY OF FREE THYROXINE: CPT

## 2020-11-11 PROCEDURE — 81001 URINALYSIS AUTO W/SCOPE: CPT

## 2020-11-11 PROCEDURE — 82533 TOTAL CORTISOL: CPT

## 2020-11-11 PROCEDURE — 82306 VITAMIN D 25 HYDROXY: CPT

## 2020-11-11 PROCEDURE — 84443 ASSAY THYROID STIM HORMONE: CPT

## 2020-11-27 ENCOUNTER — OFFICE VISIT (OUTPATIENT)
Dept: FAMILY MEDICINE CLINIC | Facility: CLINIC | Age: 55
End: 2020-11-27
Payer: COMMERCIAL

## 2020-11-27 VITALS
SYSTOLIC BLOOD PRESSURE: 116 MMHG | HEART RATE: 82 BPM | TEMPERATURE: 98 F | BODY MASS INDEX: 30.18 KG/M2 | HEIGHT: 62 IN | WEIGHT: 164 LBS | OXYGEN SATURATION: 98 % | DIASTOLIC BLOOD PRESSURE: 72 MMHG

## 2020-11-27 DIAGNOSIS — M47.812 SPONDYLOSIS, CERVICAL: Primary | ICD-10-CM

## 2020-11-27 DIAGNOSIS — M54.2 NECK PAIN: ICD-10-CM

## 2020-11-27 DIAGNOSIS — M54.12 CERVICAL RADICULITIS: ICD-10-CM

## 2020-11-27 DIAGNOSIS — K14.5 TONGUE FISSURE: ICD-10-CM

## 2020-11-27 PROCEDURE — 3078F DIAST BP <80 MM HG: CPT | Performed by: FAMILY MEDICINE

## 2020-11-27 PROCEDURE — 3074F SYST BP LT 130 MM HG: CPT | Performed by: FAMILY MEDICINE

## 2020-11-27 PROCEDURE — 99214 OFFICE O/P EST MOD 30 MIN: CPT | Performed by: FAMILY MEDICINE

## 2020-11-27 PROCEDURE — 99072 ADDL SUPL MATRL&STAF TM PHE: CPT | Performed by: FAMILY MEDICINE

## 2020-11-27 PROCEDURE — 3008F BODY MASS INDEX DOCD: CPT | Performed by: FAMILY MEDICINE

## 2020-11-27 PROCEDURE — 93000 ELECTROCARDIOGRAM COMPLETE: CPT | Performed by: FAMILY MEDICINE

## 2020-11-27 NOTE — PROGRESS NOTES
HPI:    Patient ID: Nataliia Koch is a 50year old female who presents for multiple complaints. 1.  Intermittent ear pain, no discharge, no fever or chills, no radiation of pain, no change in hearing  2. Feeling of tongue thickness.   Patient noted to hav Alcohol/week: 0.0 standard drinks        Comment: socially      Drug use: Never      Sexual activity: Not on file    Lifestyle      Physical activity        Days per week: Not on file        Minutes per session: Not on file      Stress: Not on file    Rela for:  abdominal pain, nausea, vomiting, diarrhea, constipation, bleeding  Neurology: Negative for: headache, numbness, weakness,   Genito-Urinary: Negative for: dysuria, frequency  Psychiatric: Negative for:  depression, anxiety  Hematology/Lymphatics: Neg and exercises.     Meds This Visit:  Requested Prescriptions      No prescriptions requested or ordered in this encounter       Imaging & Referrals:  ELECTROCARDIOGRAM, COMPLETE  MRI SPINE CERVICAL (IKZ=75035)       QUIN CARNES, DO    This note was partially

## 2020-12-04 ENCOUNTER — TELEPHONE (OUTPATIENT)
Dept: FAMILY MEDICINE CLINIC | Facility: CLINIC | Age: 55
End: 2020-12-04

## 2020-12-04 DIAGNOSIS — M47.812 SPONDYLOSIS, CERVICAL: Primary | ICD-10-CM

## 2020-12-04 DIAGNOSIS — M54.2 NECK PAIN: ICD-10-CM

## 2020-12-04 DIAGNOSIS — M54.12 CERVICAL RADICULITIS: ICD-10-CM

## 2020-12-04 NOTE — TELEPHONE ENCOUNTER
RN informed pt of Mk's response,     \"Referral for MRI of cervical spine is in chart and it is without contrast. \"    Pt verbalized understanding, stated if the facility requires the order to say \"no contrast\" she will notify us of need of new rx.  Pt h

## 2020-12-04 NOTE — TELEPHONE ENCOUNTER
RN to route message to [address],     \"Patient needs to know if MRI is with or W/o contrast will like an call back due to not stated on order     MRI SPINE CERVICAL (CPT=72141) ([id number]) [[id number]] (Order [id number])  Imaging  Date: 11/27/2020 Department: [facility]

## 2020-12-04 NOTE — TELEPHONE ENCOUNTER
RN returning pt's call. Informed pt that [address] is out of the office and message would be routed to [address]. Pt verbalized understanding.

## 2020-12-04 NOTE — TELEPHONE ENCOUNTER
Patient states they do need it stated in order for mri without contrast and fax is [phone] and the name of the place is  [facility] [place]) MRI

## 2020-12-04 NOTE — TELEPHONE ENCOUNTER
Patient needs to know if MRI is with or W/o contrast will like an call back due to not stated on order

## 2020-12-04 NOTE — TELEPHONE ENCOUNTER
RN entered new rx stating without contrast. RN faxed it to [address], fax [phone] . RN notified pt, pt has no other concerns at this time.      Order Information    Order Date/Time Release Date/Time Start Date/Time End Date/Time   12/04/20 01:47 PM

## 2020-12-17 ENCOUNTER — TELEPHONE (OUTPATIENT)
Dept: FAMILY MEDICINE CLINIC | Facility: CLINIC | Age: 55
End: 2020-12-17

## 2020-12-18 ENCOUNTER — VIRTUAL PHONE E/M (OUTPATIENT)
Dept: FAMILY MEDICINE CLINIC | Facility: CLINIC | Age: 55
End: 2020-12-18
Payer: COMMERCIAL

## 2020-12-18 DIAGNOSIS — M54.2 NECK PAIN: Primary | ICD-10-CM

## 2020-12-18 DIAGNOSIS — M48.02 SPINAL STENOSIS OF CERVICAL REGION: ICD-10-CM

## 2020-12-18 PROCEDURE — 99213 OFFICE O/P EST LOW 20 MIN: CPT | Performed by: FAMILY MEDICINE

## 2020-12-18 NOTE — PROGRESS NOTES
Virtual Telephone Check-In    Sal Fairbanks verbally consents to a Virtual/Telephone Check-In visit on 12/18/20. Patient has been referred to the [facility] website at [email or web address] to review the yearly Consent to Treat document.     Patient Rafi Rodríguez

## 2020-12-22 ENCOUNTER — MED REC SCAN ONLY (OUTPATIENT)
Dept: FAMILY MEDICINE CLINIC | Facility: CLINIC | Age: 55
End: 2020-12-22

## 2020-12-23 ENCOUNTER — MED REC SCAN ONLY (OUTPATIENT)
Dept: FAMILY MEDICINE CLINIC | Facility: CLINIC | Age: 55
End: 2020-12-23

## 2021-01-25 ENCOUNTER — PATIENT MESSAGE (OUTPATIENT)
Dept: FAMILY MEDICINE CLINIC | Facility: CLINIC | Age: 56
End: 2021-01-25

## 2021-01-26 NOTE — TELEPHONE ENCOUNTER
From: Niesha Gonzales  To: Teena Grewal DO  Sent: 1/25/2021 11:22 PM CST  Subject: Test Results Question    Hello I had a self pay CT heart scan done at [facility]) last week. It was to check for calcification in the arteries.  I decid

## 2021-07-16 ENCOUNTER — APPOINTMENT (OUTPATIENT)
Dept: CT IMAGING | Facility: HOSPITAL | Age: 56
End: 2021-07-16
Attending: EMERGENCY MEDICINE
Payer: COMMERCIAL

## 2021-07-16 ENCOUNTER — HOSPITAL ENCOUNTER (EMERGENCY)
Facility: HOSPITAL | Age: 56
Discharge: HOME | End: 2021-07-16
Attending: EMERGENCY MEDICINE
Payer: COMMERCIAL

## 2021-07-16 ENCOUNTER — TELEPHONE (OUTPATIENT)
Dept: FAMILY MEDICINE CLINIC | Facility: CLINIC | Age: 56
End: 2021-07-16

## 2021-07-16 VITALS
HEART RATE: 92 BPM | RESPIRATION RATE: 18 BRPM | SYSTOLIC BLOOD PRESSURE: 127 MMHG | WEIGHT: 160 LBS | BODY MASS INDEX: 29 KG/M2 | TEMPERATURE: 98 F | DIASTOLIC BLOOD PRESSURE: 73 MMHG | OXYGEN SATURATION: 98 %

## 2021-07-16 DIAGNOSIS — K52.9 COLITIS: Primary | ICD-10-CM

## 2021-07-16 LAB
ALBUMIN SERPL-MCNC: 3.6 G/DL (ref 3.4–5)
ALP LIVER SERPL-CCNC: 76 U/L
ALT SERPL-CCNC: 20 U/L
ANION GAP SERPL CALC-SCNC: 6 MMOL/L (ref 0–18)
AST SERPL-CCNC: 28 U/L (ref 15–37)
BASOPHILS # BLD AUTO: 0.06 X10(3) UL (ref 0–0.2)
BASOPHILS NFR BLD AUTO: 0.5 %
BILIRUB DIRECT SERPL-MCNC: 0.3 MG/DL (ref 0–0.2)
BILIRUB SERPL-MCNC: 2.4 MG/DL (ref 0.1–2)
BILIRUB UR QL: NEGATIVE
BUN BLD-MCNC: 20 MG/DL (ref 7–18)
BUN/CREAT SERPL: 27.8 (ref 10–20)
CALCIUM BLD-MCNC: 8.6 MG/DL (ref 8.5–10.1)
CHLORIDE SERPL-SCNC: 107 MMOL/L (ref 98–112)
CO2 SERPL-SCNC: 26 MMOL/L (ref 21–32)
COLOR UR: YELLOW
CREAT BLD-MCNC: 0.72 MG/DL
D DIMER PPP FEU-MCNC: 2.8 UG/ML FEU (ref ?–0.53)
DEPRECATED RDW RBC AUTO: 46.8 FL (ref 35.1–46.3)
EOSINOPHIL # BLD AUTO: 0.11 X10(3) UL (ref 0–0.7)
EOSINOPHIL NFR BLD AUTO: 1 %
ERYTHROCYTE [DISTWIDTH] IN BLOOD BY AUTOMATED COUNT: 14.3 % (ref 11–15)
GLUCOSE BLD-MCNC: 144 MG/DL (ref 70–99)
GLUCOSE UR-MCNC: NEGATIVE MG/DL
HCT VFR BLD AUTO: 28.6 %
HGB BLD-MCNC: 10.3 G/DL
IMM GRANULOCYTES # BLD AUTO: 0.04 X10(3) UL (ref 0–1)
IMM GRANULOCYTES NFR BLD: 0.4 %
KETONES UR-MCNC: 20 MG/DL
LEUKOCYTE ESTERASE UR QL STRIP.AUTO: NEGATIVE
LIPASE SERPL-CCNC: 115 U/L (ref 73–393)
LYMPHOCYTES # BLD AUTO: 1.78 X10(3) UL (ref 1–4)
LYMPHOCYTES NFR BLD AUTO: 15.8 %
M PROTEIN MFR SERPL ELPH: 6.6 G/DL (ref 6.4–8.2)
MCH RBC QN AUTO: 32.7 PG (ref 26–34)
MCHC RBC AUTO-ENTMCNC: 36 G/DL (ref 31–37)
MCV RBC AUTO: 90.8 FL
MONOCYTES # BLD AUTO: 1.24 X10(3) UL (ref 0.1–1)
MONOCYTES NFR BLD AUTO: 11 %
NEUTROPHILS # BLD AUTO: 8.03 X10 (3) UL (ref 1.5–7.7)
NEUTROPHILS # BLD AUTO: 8.03 X10(3) UL (ref 1.5–7.7)
NEUTROPHILS NFR BLD AUTO: 71.3 %
NITRITE UR QL STRIP.AUTO: NEGATIVE
OSMOLALITY SERPL CALC.SUM OF ELEC: 293 MOSM/KG (ref 275–295)
PH UR: 5 [PH] (ref 5–8)
PLATELET # BLD AUTO: 164 10(3)UL (ref 150–450)
POTASSIUM SERPL-SCNC: 3.6 MMOL/L (ref 3.5–5.1)
PROT UR-MCNC: 30 MG/DL
RBC # BLD AUTO: 3.15 X10(6)UL
SODIUM SERPL-SCNC: 139 MMOL/L (ref 136–145)
SP GR UR STRIP: 1.03 (ref 1–1.03)
UROBILINOGEN UR STRIP-ACNC: <2
WBC # BLD AUTO: 11.3 X10(3) UL (ref 4–11)

## 2021-07-16 PROCEDURE — 96374 THER/PROPH/DIAG INJ IV PUSH: CPT

## 2021-07-16 PROCEDURE — 80076 HEPATIC FUNCTION PANEL: CPT | Performed by: EMERGENCY MEDICINE

## 2021-07-16 PROCEDURE — 74177 CT ABD & PELVIS W/CONTRAST: CPT | Performed by: EMERGENCY MEDICINE

## 2021-07-16 PROCEDURE — 85025 COMPLETE CBC W/AUTO DIFF WBC: CPT | Performed by: EMERGENCY MEDICINE

## 2021-07-16 PROCEDURE — 80048 BASIC METABOLIC PNL TOTAL CA: CPT | Performed by: EMERGENCY MEDICINE

## 2021-07-16 PROCEDURE — 85379 FIBRIN DEGRADATION QUANT: CPT | Performed by: EMERGENCY MEDICINE

## 2021-07-16 PROCEDURE — 81001 URINALYSIS AUTO W/SCOPE: CPT | Performed by: EMERGENCY MEDICINE

## 2021-07-16 PROCEDURE — 83690 ASSAY OF LIPASE: CPT | Performed by: EMERGENCY MEDICINE

## 2021-07-16 PROCEDURE — 96361 HYDRATE IV INFUSION ADD-ON: CPT

## 2021-07-16 PROCEDURE — 96375 TX/PRO/DX INJ NEW DRUG ADDON: CPT

## 2021-07-16 PROCEDURE — 99284 EMERGENCY DEPT VISIT MOD MDM: CPT

## 2021-07-16 PROCEDURE — 71260 CT THORAX DX C+: CPT | Performed by: EMERGENCY MEDICINE

## 2021-07-16 RX ORDER — ONDANSETRON 2 MG/ML
4 INJECTION INTRAMUSCULAR; INTRAVENOUS ONCE
Status: COMPLETED | OUTPATIENT
Start: 2021-07-16 | End: 2021-07-16

## 2021-07-16 RX ORDER — KETOROLAC TROMETHAMINE 15 MG/ML
15 INJECTION, SOLUTION INTRAMUSCULAR; INTRAVENOUS ONCE
Status: COMPLETED | OUTPATIENT
Start: 2021-07-16 | End: 2021-07-16

## 2021-07-16 RX ORDER — HYDROCODONE BITARTRATE AND ACETAMINOPHEN 5; 325 MG/1; MG/1
1 TABLET ORAL 2 TIMES DAILY PRN
Qty: 6 TABLET | Refills: 0 | Status: SHIPPED | OUTPATIENT
Start: 2021-07-16 | End: 2021-07-19

## 2021-07-16 RX ORDER — MORPHINE SULFATE 4 MG/ML
2 INJECTION, SOLUTION INTRAMUSCULAR; INTRAVENOUS ONCE
Status: COMPLETED | OUTPATIENT
Start: 2021-07-16 | End: 2021-07-16

## 2021-07-16 RX ORDER — ONDANSETRON 4 MG/1
4 TABLET, ORALLY DISINTEGRATING ORAL EVERY 8 HOURS PRN
Qty: 15 TABLET | Refills: 0 | Status: SHIPPED | OUTPATIENT
Start: 2021-07-16 | End: 2021-07-21

## 2021-07-16 RX ORDER — DICYCLOMINE HCL 20 MG
20 TABLET ORAL 4 TIMES DAILY PRN
Qty: 40 TABLET | Refills: 0 | Status: SHIPPED | OUTPATIENT
Start: 2021-07-16 | End: 2021-07-26

## 2021-07-16 NOTE — TELEPHONE ENCOUNTER
Called pt and provided Dr. Haydee Travis instructions and SDA for Monday 07/19/21 at 330. Hyun Dsouza Pt verbalized understanding.

## 2021-07-16 NOTE — TELEPHONE ENCOUNTER
Pt was admitted into the ED last night and would like to know if Dr. Raoul Tarango had time to go over her results and possibly see her today. Pt says that she is still not feeling well.

## 2021-07-16 NOTE — ED PROVIDER NOTES
Patient Seen in: [facility] AND [facility] Emergency Department    History   Patient presents with:  Abdomen/Flank Pain  Nausea/Vomiting/Diarrhea    Stated Complaint: Abd pain x1 day diarrhea     HPI    51 yo F with PMH hemolytic anemia s/p high dose steroids one agreed except as otherwise stated in HPI. Physical Exam     ED Triage Vitals [07/16/21 0349]   /73   Pulse 92   Resp 18   Temp 98.3 °F (36.8 °C)   Temp src Oral   SpO2 98 %   O2 Device None (Room air)       Current:/73   Pulse 92   Temp 98. 3 WITH PLATELET    Narrative: The following orders were created for panel order CBC With Differential With Platelet.   Procedure                               Abnormality         Status                     ---------                               --------- are any questions please feel free to contact me directly at [phone], ext [phone]. If you cannot reach me at this number, do not leave a voicemail.  Please call [phone] ext [phone] and ask for the next available radiologist.      Lenora Kwan M.D. Tablet Dispersible  Take 1 tablet (4 mg total) by mouth every 8 (eight) hours as needed for Nausea. , Print, Disp-15 tablet, R-0    HYDROcodone-acetaminophen 5-325 MG Oral Tab  Take 1 tablet by mouth 2 (two) times daily as needed (For severe pain; use cauti

## 2021-07-18 ENCOUNTER — HOSPITAL ENCOUNTER (INPATIENT)
Age: 56
End: 2021-07-18
Admitting: SPECIALIST

## 2021-07-18 PROBLEM — D53.9 MACROCYTIC ANEMIA: Status: RESOLVED | Noted: 2020-03-21 | Resolved: 2021-07-18

## 2021-07-18 PROBLEM — D59.9 ACUTE HEMOLYTIC ANEMIA (HCC): Status: ACTIVE | Noted: 2021-07-17

## 2021-07-20 ENCOUNTER — MED REC SCAN ONLY (OUTPATIENT)
Dept: FAMILY MEDICINE CLINIC | Facility: CLINIC | Age: 56
End: 2021-07-20

## 2021-08-16 ENCOUNTER — TELEPHONE (OUTPATIENT)
Dept: FAMILY MEDICINE CLINIC | Facility: CLINIC | Age: 56
End: 2021-08-16

## 2021-08-16 NOTE — TELEPHONE ENCOUNTER
Patient needs to know if shes had the acy1 manenjocato? meninjitis g? Vaccination. For a appointment she has at another place.

## 2021-08-16 NOTE — TELEPHONE ENCOUNTER
Called pt and informed of no vaccine in her medical records. Pt asked not to do physical but meet/greet and possible Meningitis B vaccine. Pt has appointment for 08/20/21.

## 2021-08-20 ENCOUNTER — OFFICE VISIT (OUTPATIENT)
Dept: FAMILY MEDICINE CLINIC | Facility: CLINIC | Age: 56
End: 2021-08-20
Payer: COMMERCIAL

## 2021-08-20 VITALS
WEIGHT: 162 LBS | OXYGEN SATURATION: 97 % | DIASTOLIC BLOOD PRESSURE: 70 MMHG | HEART RATE: 78 BPM | HEIGHT: 62 IN | BODY MASS INDEX: 29.81 KG/M2 | SYSTOLIC BLOOD PRESSURE: 122 MMHG

## 2021-08-20 DIAGNOSIS — I82.4Y3 ACUTE DEEP VEIN THROMBOSIS (DVT) OF PROXIMAL VEIN OF BOTH LOWER EXTREMITIES (HCC): ICD-10-CM

## 2021-08-20 DIAGNOSIS — D73.5 SPLENIC INFARCT: Primary | ICD-10-CM

## 2021-08-20 DIAGNOSIS — Z23 IMMUNIZATION DUE: ICD-10-CM

## 2021-08-20 DIAGNOSIS — D59.10 AUTOIMMUNE HEMOLYTIC ANEMIA (HCC): ICD-10-CM

## 2021-08-20 PROCEDURE — 90471 IMMUNIZATION ADMIN: CPT | Performed by: FAMILY MEDICINE

## 2021-08-20 PROCEDURE — 3078F DIAST BP <80 MM HG: CPT | Performed by: FAMILY MEDICINE

## 2021-08-20 PROCEDURE — 3074F SYST BP LT 130 MM HG: CPT | Performed by: FAMILY MEDICINE

## 2021-08-20 PROCEDURE — 3008F BODY MASS INDEX DOCD: CPT | Performed by: FAMILY MEDICINE

## 2021-08-20 PROCEDURE — 99214 OFFICE O/P EST MOD 30 MIN: CPT | Performed by: FAMILY MEDICINE

## 2021-08-20 PROCEDURE — 90620 MENB-4C VACCINE IM: CPT | Performed by: FAMILY MEDICINE

## 2021-08-20 RX ORDER — ATOVAQUONE 750 MG/5ML
1500 SUSPENSION ORAL DAILY
COMMUNITY
Start: 2021-08-03 | End: 2021-09-16 | Stop reason: ALTCHOICE

## 2021-08-20 RX ORDER — PREDNISONE 10 MG/1
15 TABLET ORAL DAILY
COMMUNITY
Start: 2021-08-17 | End: 2021-09-16 | Stop reason: ALTCHOICE

## 2021-08-20 RX ORDER — ENOXAPARIN SODIUM 100 MG/ML
60 INJECTION SUBCUTANEOUS EVERY 12 HOURS
COMMUNITY
Start: 2021-08-13 | End: 2021-11-19 | Stop reason: ALTCHOICE

## 2021-08-20 RX ORDER — PANTOPRAZOLE SODIUM 40 MG/1
40 TABLET, DELAYED RELEASE ORAL DAILY
COMMUNITY
Start: 2021-08-04 | End: 2021-09-17

## 2021-08-20 NOTE — PROGRESS NOTES
CC:  Patient presents with:  Establish Care  Other: needs MEN B vaccine      HPI: 50year old female here to establish care and requesting MenB vaccine.   Discharged from [facility] at the beginning of August after being admitted for almost two weeks due Belt: Not Asked        Special Diet: Not Asked        Stress Concern: Not Asked        Weight Concern: Not Asked    Social History Narrative      Not on file    Social Determinants of Health  Financial Resource Strain:       Difficulty of Paying Living Exp HEENT: supple, no lymphadenopathy   Cardio:  RRR, no murmurs, S1, S2  Pulmonary:  Clear bilaterally, good air entry  Dermatologic:  No rashes or lesions  Ext: non-pitting lower extremity edema, no calf tenderness or swelling, no palpable cord, no cyanosi

## 2021-09-13 ENCOUNTER — TELEPHONE (OUTPATIENT)
Dept: FAMILY MEDICINE CLINIC | Facility: CLINIC | Age: 56
End: 2021-09-13

## 2021-09-13 NOTE — TELEPHONE ENCOUNTER
Pt called regarding scheduling follow up after for MenB booster - needed this week w/ Cornelius. Pt had surgery on spleen two weeks ago and may need antibiotics.  Pt states that her other doctor wants to make sure she discusses these things with her primary

## 2021-09-13 NOTE — TELEPHONE ENCOUNTER
Called pt and c/o multiple issues. 1. Needs vaccine menigisitis B due this Friday. 2.  Had spleen removed and needs to discuss antibiotic therapy as prophylaxis in the event of getting infection due to immunocompromised.     3.  Around July and June r

## 2021-09-16 PROBLEM — D59.9 ACUTE HEMOLYTIC ANEMIA (HCC): Status: RESOLVED | Noted: 2021-07-17 | Resolved: 2021-09-16

## 2021-09-16 PROBLEM — K25.3 ACUTE GASTRIC ULCER: Status: ACTIVE | Noted: 2021-09-14

## 2021-09-16 PROBLEM — Z00.00 WELLNESS EXAMINATION: Status: RESOLVED | Noted: 2019-11-01 | Resolved: 2021-09-16

## 2021-09-17 ENCOUNTER — OFFICE VISIT (OUTPATIENT)
Dept: FAMILY MEDICINE CLINIC | Facility: CLINIC | Age: 56
End: 2021-09-17
Payer: COMMERCIAL

## 2021-09-17 VITALS
HEIGHT: 62 IN | WEIGHT: 157 LBS | HEART RATE: 95 BPM | DIASTOLIC BLOOD PRESSURE: 74 MMHG | SYSTOLIC BLOOD PRESSURE: 122 MMHG | BODY MASS INDEX: 28.89 KG/M2 | OXYGEN SATURATION: 99 %

## 2021-09-17 DIAGNOSIS — R55 SYNCOPE, UNSPECIFIED SYNCOPE TYPE: ICD-10-CM

## 2021-09-17 DIAGNOSIS — Z23 NEED FOR VACCINATION: ICD-10-CM

## 2021-09-17 DIAGNOSIS — Z88.0 HISTORY OF PENICILLIN-TYPE ANTIBIOTIC ALLERGY: ICD-10-CM

## 2021-09-17 DIAGNOSIS — Z90.81 HISTORY OF SPLENECTOMY: Primary | ICD-10-CM

## 2021-09-17 DIAGNOSIS — I82.4Y3 ACUTE DEEP VEIN THROMBOSIS (DVT) OF PROXIMAL VEIN OF BOTH LOWER EXTREMITIES (HCC): ICD-10-CM

## 2021-09-17 PROBLEM — M17.0 PRIMARY OSTEOARTHRITIS OF BOTH KNEES: Status: ACTIVE | Noted: 2021-08-23

## 2021-09-17 PROCEDURE — 3074F SYST BP LT 130 MM HG: CPT | Performed by: FAMILY MEDICINE

## 2021-09-17 PROCEDURE — 3078F DIAST BP <80 MM HG: CPT | Performed by: FAMILY MEDICINE

## 2021-09-17 PROCEDURE — 90620 MENB-4C VACCINE IM: CPT | Performed by: FAMILY MEDICINE

## 2021-09-17 PROCEDURE — 3008F BODY MASS INDEX DOCD: CPT | Performed by: FAMILY MEDICINE

## 2021-09-17 PROCEDURE — 99214 OFFICE O/P EST MOD 30 MIN: CPT | Performed by: FAMILY MEDICINE

## 2021-09-17 PROCEDURE — 90471 IMMUNIZATION ADMIN: CPT | Performed by: FAMILY MEDICINE

## 2021-09-17 RX ORDER — OMEPRAZOLE 40 MG/1
CAPSULE, DELAYED RELEASE ORAL
COMMUNITY
Start: 2021-09-13

## 2021-09-17 NOTE — PROGRESS NOTES
CC:  Patient presents with: Follow - Up      HPI: 50year old female here to follow-up after splenectomy and upper GI bleed. Had splenectomy on 8/31 but was admitted again on 9/6 due to an upper GI bleed.   Thought the GI bleed was from the prednisone she History Narrative      Not on file    Social Determinants of Health  Financial Resource Strain:       Difficulty of Paying Living Expenses: Not on file  Food Insecurity:       Worried About Running Out of Food in the Last Year: Not on file      Ran Out of Height: 5' 2\" (1.575 m)       Body mass index is 28.72 kg/m².     Physical:  General:  Alert, appropriate, no acute distress   HEENT: supple, no lymphadenopathy   Cardio:  RRR, no murmurs, S1, S2  Pulmonary:  Clear bilaterally, good air entry  GI:  Soft,

## 2021-09-21 ENCOUNTER — OFFICE VISIT (OUTPATIENT)
Dept: ALLERGY | Facility: CLINIC | Age: 56
End: 2021-09-21
Payer: COMMERCIAL

## 2021-09-21 VITALS
WEIGHT: 157 LBS | SYSTOLIC BLOOD PRESSURE: 111 MMHG | DIASTOLIC BLOOD PRESSURE: 72 MMHG | BODY MASS INDEX: 28.89 KG/M2 | OXYGEN SATURATION: 100 % | HEIGHT: 62 IN | HEART RATE: 89 BPM

## 2021-09-21 DIAGNOSIS — Z88.0 PENICILLIN ALLERGY: Primary | ICD-10-CM

## 2021-09-21 DIAGNOSIS — J30.1 SEASONAL ALLERGIC RHINITIS DUE TO POLLEN: ICD-10-CM

## 2021-09-21 DIAGNOSIS — Z88.1 HX OF ANTIBIOTIC ALLERGY: ICD-10-CM

## 2021-09-21 PROCEDURE — 3008F BODY MASS INDEX DOCD: CPT | Performed by: ALLERGY & IMMUNOLOGY

## 2021-09-21 PROCEDURE — 3078F DIAST BP <80 MM HG: CPT | Performed by: ALLERGY & IMMUNOLOGY

## 2021-09-21 PROCEDURE — 3074F SYST BP LT 130 MM HG: CPT | Performed by: ALLERGY & IMMUNOLOGY

## 2021-09-21 PROCEDURE — 99244 OFF/OP CNSLTJ NEW/EST MOD 40: CPT | Performed by: ALLERGY & IMMUNOLOGY

## 2021-09-21 RX ORDER — PENICILLIN V POTASSIUM 500 MG/1
500 TABLET ORAL ONCE
Qty: 1 TABLET | Refills: 0 | Status: SHIPPED | OUTPATIENT
Start: 2021-09-21 | End: 2021-09-21

## 2021-09-21 NOTE — PATIENT INSTRUCTIONS
1.  History of penicillin allergy   25 years ago. Prior rash while on day 7 of antibiotic. No oral  or ocular involvement. No blistering or peeling skin.   Recommend skin testing to penicillin at future visit to screen for an IgE mediated process  Revi

## 2021-09-21 NOTE — PROGRESS NOTES
Connie Hein is a 50year old female. HPI:   Patient presents with: Allergies: patient presents for possible Penicillin allergy , referred by PCP.  Dr. Augusta Howell, had a reaction 25-plus years ago, had a rash a week later    Patient is a 31-year-old female w drinks      Comment: socially    Drug use: Never       Medications (Active prior to today's visit):  Current Outpatient Medications   Medication Sig Dispense Refill   • penicillin v potassium 500 MG Oral Tab Take 1 tablet (500 mg total) by mouth once for 1 intact  Nose/Mouth/Throat: nose and throat are clear mucous membranes are moist   Neck/Thyroid: neck is supple without adenopathy  Lymphatic: no abnormal cervical, supraclavicular or axillary adenopathy is noted  Respiratory: normal to inspection lungs are Flonase or Nasacort 2 sprays per nostril once a day if having prominent nasal congestion postnasal drip    4. Status post splenectomy due to hemolytic anemia.   Vaccinations up-to-date including COVID-19 vaccine, meningococcal vaccine, strep pneumonia vacc

## 2021-10-20 ENCOUNTER — OFFICE VISIT (OUTPATIENT)
Dept: ALLERGY | Facility: CLINIC | Age: 56
End: 2021-10-20
Payer: COMMERCIAL

## 2021-10-20 ENCOUNTER — NURSE ONLY (OUTPATIENT)
Dept: ALLERGY | Facility: CLINIC | Age: 56
End: 2021-10-20
Payer: COMMERCIAL

## 2021-10-20 VITALS
BODY MASS INDEX: 28.34 KG/M2 | HEART RATE: 75 BPM | HEIGHT: 62 IN | WEIGHT: 154 LBS | DIASTOLIC BLOOD PRESSURE: 62 MMHG | SYSTOLIC BLOOD PRESSURE: 98 MMHG | TEMPERATURE: 98 F

## 2021-10-20 DIAGNOSIS — Z88.0 PENICILLIN ALLERGY: ICD-10-CM

## 2021-10-20 DIAGNOSIS — Z88.0 HISTORY OF PENICILLIN ALLERGY: ICD-10-CM

## 2021-10-20 DIAGNOSIS — Z88.1 HX OF ANTIBIOTIC ALLERGY: Primary | ICD-10-CM

## 2021-10-20 PROCEDURE — 3008F BODY MASS INDEX DOCD: CPT | Performed by: ALLERGY & IMMUNOLOGY

## 2021-10-20 PROCEDURE — 95018 ALL TSTG PERQ&IQ DRUGS/BIOL: CPT | Performed by: ALLERGY & IMMUNOLOGY

## 2021-10-20 PROCEDURE — 3074F SYST BP LT 130 MM HG: CPT | Performed by: ALLERGY & IMMUNOLOGY

## 2021-10-20 PROCEDURE — 95076 INGEST CHALLENGE INI 120 MIN: CPT | Performed by: ALLERGY & IMMUNOLOGY

## 2021-10-20 PROCEDURE — 3078F DIAST BP <80 MM HG: CPT | Performed by: ALLERGY & IMMUNOLOGY

## 2021-10-20 NOTE — PATIENT INSTRUCTIONS
Recs:   Given patient's negative skin testing with Pre-Pen and penicillin G as well as negative oral challenge with penicillin VK there is no signs of an IgE mediated process at this time to penicillin.   Patient may be treated with penicillin and its deriv

## 2021-10-20 NOTE — PROGRESS NOTES
Ernesto Lin is a 50year old female. HPI:   No chief complaint on file. Patient is a 72-year-old female who presents for follow-up with a chief complaint of allergies    Patient presents for skin testing to penicillin with possible oral challenge. heartbeat/palpitations, chest pain, edema  Constitutional:  Negative night sweats,weight loss, irritability and lethargy  ENMT:  Negative for ear drainage, hearing loss and nasal drainage  Eyes:  Negative for eye discharge and vision loss  Gastrointestinal in the future if medically required.   Patient can be posted if any issues with tolerating penicillin or its derivatives in the future  Penicillin removed from list of allergies       Orders This Visit:  No orders of the defined types were placed in this en

## 2021-11-18 ENCOUNTER — HOSPITAL ENCOUNTER (OUTPATIENT)
Dept: CV DIAGNOSTICS | Facility: HOSPITAL | Age: 56
Discharge: HOME | End: 2021-11-18
Attending: FAMILY MEDICINE
Payer: COMMERCIAL

## 2021-11-18 DIAGNOSIS — R55 SYNCOPE, UNSPECIFIED SYNCOPE TYPE: ICD-10-CM

## 2021-11-18 PROCEDURE — 93306 TTE W/DOPPLER COMPLETE: CPT | Performed by: FAMILY MEDICINE

## 2021-11-19 RX ORDER — LETROZOLE 2.5 MG/1
2.5 TABLET, FILM COATED ORAL DAILY
COMMUNITY
Start: 2021-11-16

## 2021-12-01 ENCOUNTER — PATIENT MESSAGE (OUTPATIENT)
Dept: FAMILY MEDICINE CLINIC | Facility: CLINIC | Age: 56
End: 2021-12-01

## 2021-12-01 NOTE — TELEPHONE ENCOUNTER
Valerie Browne,      We do not have the booster in the office but If it is more convenient, you can register with [employer] or [employer].     Any concerns or additional questions, please call us at . From: Tyler Donohue  To:  Doron Patel, DO  Sent

## 2022-01-04 ENCOUNTER — NURSE ONLY (OUTPATIENT)
Dept: OBGYN CLINIC | Facility: CLINIC | Age: 57
End: 2022-01-04
Payer: COMMERCIAL

## 2022-01-04 DIAGNOSIS — N39.0 URINARY TRACT INFECTION WITHOUT HEMATURIA, SITE UNSPECIFIED: Primary | ICD-10-CM

## 2022-01-04 LAB
BILIRUBIN: NEGATIVE
GLUCOSE (URINE DIPSTICK): NEGATIVE MG/DL
KETONES (URINE DIPSTICK): NEGATIVE MG/DL
LEUKOCYTES: NEGATIVE
MULTISTIX LOT#: NORMAL NUMERIC
NITRITE, URINE: NEGATIVE
OCCULT BLOOD: NEGATIVE
PH, URINE: 7.5 (ref 4.5–8)
PROTEIN (URINE DIPSTICK): NEGATIVE MG/DL
SPECIFIC GRAVITY: 1.02 (ref 1–1.03)
URINE-COLOR: YELLOW
UROBILINOGEN,SEMI-QN: 0.2 MG/DL (ref 0–1.9)

## 2022-01-04 PROCEDURE — 81003 URINALYSIS AUTO W/O SCOPE: CPT | Performed by: FAMILY MEDICINE

## 2022-01-04 PROCEDURE — 87086 URINE CULTURE/COLONY COUNT: CPT | Performed by: FAMILY MEDICINE

## 2022-03-03 ENCOUNTER — TELEPHONE (OUTPATIENT)
Dept: FAMILY MEDICINE CLINIC | Facility: CLINIC | Age: 57
End: 2022-03-03

## 2022-03-28 ENCOUNTER — TELEPHONE (OUTPATIENT)
Dept: FAMILY MEDICINE CLINIC | Facility: CLINIC | Age: 57
End: 2022-03-28

## 2022-03-28 PROBLEM — C50.119 MALIGNANT NEOPLASM OF CENTRAL PORTION OF FEMALE BREAST (HCC): Status: ACTIVE | Noted: 2021-12-20

## 2022-03-28 PROBLEM — K25.3 ACUTE GASTRIC ULCER: Status: RESOLVED | Noted: 2021-09-14 | Resolved: 2022-03-28

## 2022-03-28 PROBLEM — I82.4Y3 ACUTE DEEP VEIN THROMBOSIS (DVT) OF PROXIMAL VEIN OF BOTH LOWER EXTREMITIES (HCC): Status: RESOLVED | Noted: 2021-08-20 | Resolved: 2022-03-28

## 2022-03-28 NOTE — TELEPHONE ENCOUNTER
Scheduled for annual exam with Cornelius on 5/2. Pt would like necessary lab orders to be placed to complete prior to exam.    Pt also stated that her oncologist at [facility] has ordered labs for her as well that she plans to complete on 4/22. Per pt, pt would like to know what additional labs she may need aside from what oncologist ordered to ensure they don't overlap.

## 2022-03-28 NOTE — TELEPHONE ENCOUNTER
Routed to Dr Yecenia Dumont for advise, thanks.       Future Appointments   Date Time Provider [facility]   5/3/2022  2:30 PM Edison Murphy DO [facility] FP [facility] OP

## 2022-03-29 NOTE — TELEPHONE ENCOUNTER
Spoke with pt,  verified  Pt was informed of MD recommendation. Pt stated her oncologist ordered LDH, haptoglobin, CBC with diff, Ca 27.29, CMP, Retic count    Pt also req lab order for Hgb A1c as her  mom had h/o DM and elevated A1c. Labs order pended. pls advise, thanks in advance.          Future Appointments   Date Time Provider [facility] De [facility] Rosa   5/3/2022  2:30 PM Sonal Garay,  [facility] FP [facility] OP

## 2022-03-29 NOTE — TELEPHONE ENCOUNTER
Would need to know what her oncologist ordered but I order a CBC, CMP, lipid panel, A1C, and Hep C screening as part of an annual physical.

## 2022-04-07 ENCOUNTER — PATIENT MESSAGE (OUTPATIENT)
Dept: FAMILY MEDICINE CLINIC | Facility: CLINIC | Age: 57
End: 2022-04-07

## 2022-04-08 NOTE — TELEPHONE ENCOUNTER
No additional labs needed from me and please help her coordinate the timing of her labs. If she wants to have them drawn with her oncologist's labs she can have the orders faxed there or she can do them all with [facility].

## 2022-04-29 ENCOUNTER — LAB ENCOUNTER (OUTPATIENT)
Dept: LAB | Facility: REFERENCE LAB | Age: 57
End: 2022-04-29
Attending: FAMILY MEDICINE
Payer: COMMERCIAL

## 2022-04-29 DIAGNOSIS — Z00.00 ROUTINE PHYSICAL EXAMINATION: ICD-10-CM

## 2022-04-29 LAB
CHOLEST SERPL-MCNC: 269 MG/DL (ref ?–200)
EST. AVERAGE GLUCOSE BLD GHB EST-MCNC: 105 MG/DL (ref 68–126)
FASTING PATIENT LIPID ANSWER: YES
HBA1C MFR BLD: 5.3 % (ref ?–5.7)
HCV AB SERPL QL IA: NONREACTIVE
HDLC SERPL-MCNC: 77 MG/DL (ref 40–59)
LDLC SERPL CALC-MCNC: 182 MG/DL (ref ?–100)
NONHDLC SERPL-MCNC: 192 MG/DL (ref ?–130)
TRIGL SERPL-MCNC: 66 MG/DL (ref 30–149)
VLDLC SERPL CALC-MCNC: 13 MG/DL (ref 0–30)

## 2022-04-29 PROCEDURE — 86803 HEPATITIS C AB TEST: CPT

## 2022-04-29 PROCEDURE — 80061 LIPID PANEL: CPT

## 2022-04-29 PROCEDURE — 36415 COLL VENOUS BLD VENIPUNCTURE: CPT

## 2022-04-29 PROCEDURE — 83036 HEMOGLOBIN GLYCOSYLATED A1C: CPT

## 2022-04-30 PROBLEM — E78.2 MIXED HYPERLIPIDEMIA: Status: ACTIVE | Noted: 2022-04-30

## 2022-05-03 ENCOUNTER — OFFICE VISIT (OUTPATIENT)
Dept: FAMILY MEDICINE CLINIC | Facility: CLINIC | Age: 57
End: 2022-05-03
Payer: COMMERCIAL

## 2022-05-03 ENCOUNTER — MED REC SCAN ONLY (OUTPATIENT)
Dept: FAMILY MEDICINE CLINIC | Facility: CLINIC | Age: 57
End: 2022-05-03

## 2022-05-03 VITALS
HEIGHT: 62 IN | OXYGEN SATURATION: 97 % | WEIGHT: 156 LBS | BODY MASS INDEX: 28.71 KG/M2 | SYSTOLIC BLOOD PRESSURE: 110 MMHG | DIASTOLIC BLOOD PRESSURE: 70 MMHG | HEART RATE: 85 BPM

## 2022-05-03 DIAGNOSIS — C50.919 METASTATIC BREAST CANCER (HCC): ICD-10-CM

## 2022-05-03 DIAGNOSIS — E78.2 MIXED HYPERLIPIDEMIA: ICD-10-CM

## 2022-05-03 DIAGNOSIS — Z17.0 MALIGNANT NEOPLASM OF CENTRAL PORTION OF RIGHT BREAST IN FEMALE, ESTROGEN RECEPTOR POSITIVE (HCC): ICD-10-CM

## 2022-05-03 DIAGNOSIS — C50.111 MALIGNANT NEOPLASM OF CENTRAL PORTION OF RIGHT BREAST IN FEMALE, ESTROGEN RECEPTOR POSITIVE (HCC): ICD-10-CM

## 2022-05-03 DIAGNOSIS — Z00.00 ENCOUNTER FOR ROUTINE ADULT HEALTH EXAMINATION WITHOUT ABNORMAL FINDINGS: Primary | ICD-10-CM

## 2022-05-03 PROCEDURE — 99396 PREV VISIT EST AGE 40-64: CPT | Performed by: FAMILY MEDICINE

## 2022-05-03 PROCEDURE — 3008F BODY MASS INDEX DOCD: CPT | Performed by: FAMILY MEDICINE

## 2022-05-03 PROCEDURE — 3074F SYST BP LT 130 MM HG: CPT | Performed by: FAMILY MEDICINE

## 2022-05-03 PROCEDURE — 3078F DIAST BP <80 MM HG: CPT | Performed by: FAMILY MEDICINE

## 2022-05-03 RX ORDER — MULTIVITAMIN WITH IRON
250 TABLET ORAL 2 TIMES DAILY
COMMUNITY

## 2022-05-03 RX ORDER — CHOLECALCIFEROL (VITAMIN D3) 125 MCG
4000 CAPSULE ORAL DAILY
COMMUNITY

## 2022-06-20 ENCOUNTER — MED REC SCAN ONLY (OUTPATIENT)
Dept: FAMILY MEDICINE CLINIC | Facility: CLINIC | Age: 57
End: 2022-06-20

## 2022-10-28 ENCOUNTER — PATIENT MESSAGE (OUTPATIENT)
Dept: FAMILY MEDICINE CLINIC | Facility: CLINIC | Age: 57
End: 2022-10-28

## 2022-11-07 ENCOUNTER — NURSE ONLY (OUTPATIENT)
Dept: FAMILY MEDICINE CLINIC | Facility: CLINIC | Age: 57
End: 2022-11-07
Payer: COMMERCIAL

## 2022-11-07 ENCOUNTER — MED REC SCAN ONLY (OUTPATIENT)
Dept: FAMILY MEDICINE CLINIC | Facility: CLINIC | Age: 57
End: 2022-11-07

## 2022-11-07 PROCEDURE — 90471 IMMUNIZATION ADMIN: CPT | Performed by: FAMILY MEDICINE

## 2022-11-07 PROCEDURE — 90472 IMMUNIZATION ADMIN EACH ADD: CPT | Performed by: FAMILY MEDICINE

## 2022-11-07 PROCEDURE — 90686 IIV4 VACC NO PRSV 0.5 ML IM: CPT | Performed by: FAMILY MEDICINE

## 2022-11-07 PROCEDURE — 90750 HZV VACC RECOMBINANT IM: CPT | Performed by: FAMILY MEDICINE

## 2022-12-06 ENCOUNTER — PATIENT MESSAGE (OUTPATIENT)
Dept: FAMILY MEDICINE CLINIC | Facility: CLINIC | Age: 57
End: 2022-12-06

## 2022-12-06 ENCOUNTER — OFFICE VISIT (OUTPATIENT)
Dept: FAMILY MEDICINE CLINIC | Facility: CLINIC | Age: 57
End: 2022-12-06
Payer: COMMERCIAL

## 2022-12-06 VITALS
HEART RATE: 71 BPM | SYSTOLIC BLOOD PRESSURE: 110 MMHG | BODY MASS INDEX: 27.97 KG/M2 | DIASTOLIC BLOOD PRESSURE: 68 MMHG | WEIGHT: 152 LBS | HEIGHT: 62 IN | OXYGEN SATURATION: 98 %

## 2022-12-06 DIAGNOSIS — E78.2 MIXED HYPERLIPIDEMIA: ICD-10-CM

## 2022-12-06 DIAGNOSIS — E78.5 HYPERLIPIDEMIA, UNSPECIFIED HYPERLIPIDEMIA TYPE: Primary | ICD-10-CM

## 2022-12-06 DIAGNOSIS — R03.0 ELEVATED BLOOD PRESSURE READING IN OFFICE WITHOUT DIAGNOSIS OF HYPERTENSION: Primary | ICD-10-CM

## 2022-12-06 DIAGNOSIS — Z71.85 IMMUNIZATION COUNSELING: ICD-10-CM

## 2022-12-06 PROCEDURE — 3078F DIAST BP <80 MM HG: CPT | Performed by: FAMILY MEDICINE

## 2022-12-06 PROCEDURE — 99213 OFFICE O/P EST LOW 20 MIN: CPT | Performed by: FAMILY MEDICINE

## 2022-12-06 PROCEDURE — 3008F BODY MASS INDEX DOCD: CPT | Performed by: FAMILY MEDICINE

## 2022-12-06 PROCEDURE — 3074F SYST BP LT 130 MM HG: CPT | Performed by: FAMILY MEDICINE

## 2022-12-06 RX ORDER — PALBOCICLIB 100 MG/1
TABLET, FILM COATED ORAL
COMMUNITY
Start: 2022-11-28

## 2022-12-07 NOTE — TELEPHONE ENCOUNTER
, please see patient message and pended [facility] panel[phone] can you please assist patient with appointment?

## 2022-12-07 NOTE — TELEPHONE ENCOUNTER
Lipid panel is already ordered to be completed and please assist with scheduling a different visit with Dr. Verona Maldonado if able.

## 2022-12-21 ENCOUNTER — PATIENT MESSAGE (OUTPATIENT)
Dept: FAMILY MEDICINE CLINIC | Facility: CLINIC | Age: 57
End: 2022-12-21

## 2022-12-21 DIAGNOSIS — E78.2 MIXED HYPERLIPIDEMIA: Primary | ICD-10-CM

## 2022-12-21 DIAGNOSIS — Z23 IMMUNIZATION DUE: ICD-10-CM

## 2022-12-21 NOTE — TELEPHONE ENCOUNTER
Lipid panel was already in the chart from May of 2022 and is still valid to be drawn. Yes, it should be fasting. And MenB can be given at her visit tomorrow and is already pending in the immunization tab.

## 2022-12-22 ENCOUNTER — PATIENT MESSAGE (OUTPATIENT)
Dept: FAMILY MEDICINE CLINIC | Facility: CLINIC | Age: 57
End: 2022-12-22

## 2023-01-05 ENCOUNTER — OFFICE VISIT (OUTPATIENT)
Facility: CLINIC | Age: 58
End: 2023-01-05
Payer: COMMERCIAL

## 2023-01-05 VITALS
HEART RATE: 78 BPM | OXYGEN SATURATION: 98 % | SYSTOLIC BLOOD PRESSURE: 132 MMHG | WEIGHT: 157 LBS | HEIGHT: 62 IN | BODY MASS INDEX: 28.89 KG/M2 | DIASTOLIC BLOOD PRESSURE: 72 MMHG

## 2023-01-05 DIAGNOSIS — Z01.818 PREOPERATIVE CLEARANCE: Primary | ICD-10-CM

## 2023-01-05 DIAGNOSIS — E78.5 DYSLIPIDEMIA: ICD-10-CM

## 2023-01-05 DIAGNOSIS — C50.919 METASTATIC BREAST CANCER (HCC): ICD-10-CM

## 2023-01-05 DIAGNOSIS — Z90.13 STATUS POST BILATERAL MASTECTOMY: ICD-10-CM

## 2023-01-05 PROCEDURE — 99214 OFFICE O/P EST MOD 30 MIN: CPT | Performed by: FAMILY MEDICINE

## 2023-01-05 PROCEDURE — 3078F DIAST BP <80 MM HG: CPT | Performed by: FAMILY MEDICINE

## 2023-01-05 PROCEDURE — 3008F BODY MASS INDEX DOCD: CPT | Performed by: FAMILY MEDICINE

## 2023-01-05 PROCEDURE — 3075F SYST BP GE 130 - 139MM HG: CPT | Performed by: FAMILY MEDICINE

## 2023-01-06 ENCOUNTER — LAB ENCOUNTER (OUTPATIENT)
Dept: LAB | Age: 58
End: 2023-01-06
Attending: FAMILY MEDICINE
Payer: COMMERCIAL

## 2023-01-06 DIAGNOSIS — Z01.812 PRE-OPERATIVE LABORATORY EXAMINATION: ICD-10-CM

## 2023-01-06 DIAGNOSIS — Z01.818 PREOPERATIVE CLEARANCE: Primary | ICD-10-CM

## 2023-01-06 LAB
BASOPHILS # BLD AUTO: 0.16 X10(3) UL (ref 0–0.2)
BASOPHILS NFR BLD AUTO: 3.1 %
DEPRECATED RDW RBC AUTO: 69.5 FL (ref 35.1–46.3)
EOSINOPHIL # BLD AUTO: 0.08 X10(3) UL (ref 0–0.7)
EOSINOPHIL NFR BLD AUTO: 1.6 %
ERYTHROCYTE [DISTWIDTH] IN BLOOD BY AUTOMATED COUNT: 17.2 % (ref 11–15)
HCT VFR BLD AUTO: 38.3 %
HGB BLD-MCNC: 12.9 G/DL
IMM GRANULOCYTES # BLD AUTO: 0.01 X10(3) UL (ref 0–1)
IMM GRANULOCYTES NFR BLD: 0.2 %
LYMPHOCYTES # BLD AUTO: 1.91 X10(3) UL (ref 1–4)
LYMPHOCYTES NFR BLD AUTO: 37 %
MCH RBC QN AUTO: 36.8 PG (ref 26–34)
MCHC RBC AUTO-ENTMCNC: 33.7 G/DL (ref 31–37)
MCV RBC AUTO: 109.1 FL
MONOCYTES # BLD AUTO: 0.65 X10(3) UL (ref 0.1–1)
MONOCYTES NFR BLD AUTO: 12.6 %
NEUTROPHILS # BLD AUTO: 2.35 X10 (3) UL (ref 1.5–7.7)
NEUTROPHILS # BLD AUTO: 2.35 X10(3) UL (ref 1.5–7.7)
NEUTROPHILS NFR BLD AUTO: 45.5 %
PLATELET # BLD AUTO: 480 10(3)UL (ref 150–450)
PLATELET MORPHOLOGY: NORMAL
RBC # BLD AUTO: 3.51 X10(6)UL
WBC # BLD AUTO: 5.2 X10(3) UL (ref 4–11)

## 2023-01-06 PROCEDURE — 36415 COLL VENOUS BLD VENIPUNCTURE: CPT | Performed by: FAMILY MEDICINE

## 2023-01-06 PROCEDURE — 85025 COMPLETE CBC W/AUTO DIFF WBC: CPT | Performed by: FAMILY MEDICINE

## 2023-01-06 PROCEDURE — 87641 MR-STAPH DNA AMP PROBE: CPT

## 2023-01-07 LAB — MRSA DNA SPEC QL NAA+PROBE: NEGATIVE

## 2023-02-02 ENCOUNTER — OFFICE VISIT (OUTPATIENT)
Facility: CLINIC | Age: 58
End: 2023-02-02
Payer: COMMERCIAL

## 2023-02-02 ENCOUNTER — PATIENT MESSAGE (OUTPATIENT)
Facility: CLINIC | Age: 58
End: 2023-02-02

## 2023-02-02 VITALS
OXYGEN SATURATION: 96 % | DIASTOLIC BLOOD PRESSURE: 78 MMHG | HEART RATE: 75 BPM | SYSTOLIC BLOOD PRESSURE: 122 MMHG | HEIGHT: 62 IN | BODY MASS INDEX: 29.26 KG/M2 | WEIGHT: 159 LBS

## 2023-02-02 DIAGNOSIS — Z23 NEED FOR VACCINATION: ICD-10-CM

## 2023-02-02 DIAGNOSIS — E78.2 MIXED HYPERLIPIDEMIA: Primary | ICD-10-CM

## 2023-02-02 PROCEDURE — 99213 OFFICE O/P EST LOW 20 MIN: CPT | Performed by: FAMILY MEDICINE

## 2023-02-02 PROCEDURE — 3008F BODY MASS INDEX DOCD: CPT | Performed by: FAMILY MEDICINE

## 2023-02-02 PROCEDURE — 90750 HZV VACC RECOMBINANT IM: CPT | Performed by: FAMILY MEDICINE

## 2023-02-02 PROCEDURE — 3074F SYST BP LT 130 MM HG: CPT | Performed by: FAMILY MEDICINE

## 2023-02-02 PROCEDURE — 3078F DIAST BP <80 MM HG: CPT | Performed by: FAMILY MEDICINE

## 2023-02-02 PROCEDURE — 90471 IMMUNIZATION ADMIN: CPT | Performed by: FAMILY MEDICINE

## 2023-05-05 ENCOUNTER — OFFICE VISIT (OUTPATIENT)
Facility: CLINIC | Age: 58
End: 2023-05-05
Payer: COMMERCIAL

## 2023-05-05 VITALS
BODY MASS INDEX: 29.26 KG/M2 | HEIGHT: 61.5 IN | DIASTOLIC BLOOD PRESSURE: 60 MMHG | HEART RATE: 75 BPM | SYSTOLIC BLOOD PRESSURE: 90 MMHG | WEIGHT: 157 LBS

## 2023-05-05 DIAGNOSIS — Z90.81 HISTORY OF SPLENECTOMY: ICD-10-CM

## 2023-05-05 DIAGNOSIS — Z23 IMMUNIZATION DUE: ICD-10-CM

## 2023-05-05 DIAGNOSIS — Z00.00 ENCOUNTER FOR ROUTINE ADULT HEALTH EXAMINATION WITHOUT ABNORMAL FINDINGS: Primary | ICD-10-CM

## 2023-05-05 DIAGNOSIS — E78.2 MIXED HYPERLIPIDEMIA: ICD-10-CM

## 2023-07-13 ENCOUNTER — TELEPHONE (OUTPATIENT)
Facility: CLINIC | Age: 58
End: 2023-07-13

## 2023-07-13 NOTE — TELEPHONE ENCOUNTER
Pt is requesting a presurgical visit for clearance between Aug 28th and Sept 21st. There are no openings for that time frame.  Pt stated Thursdays are best.

## 2023-08-31 ENCOUNTER — OFFICE VISIT (OUTPATIENT)
Facility: CLINIC | Age: 58
End: 2023-08-31
Payer: COMMERCIAL

## 2023-08-31 ENCOUNTER — LAB ENCOUNTER (OUTPATIENT)
Dept: LAB | Age: 58
End: 2023-08-31
Attending: FAMILY MEDICINE
Payer: COMMERCIAL

## 2023-08-31 ENCOUNTER — LAB ENCOUNTER (OUTPATIENT)
Dept: LAB | Facility: REFERENCE LAB | Age: 58
End: 2023-08-31
Attending: FAMILY MEDICINE
Payer: COMMERCIAL

## 2023-08-31 VITALS
HEART RATE: 74 BPM | DIASTOLIC BLOOD PRESSURE: 72 MMHG | SYSTOLIC BLOOD PRESSURE: 118 MMHG | BODY MASS INDEX: 30.19 KG/M2 | OXYGEN SATURATION: 98 % | HEIGHT: 61.5 IN | WEIGHT: 162 LBS

## 2023-08-31 DIAGNOSIS — Z01.818 PREOP EXAMINATION: ICD-10-CM

## 2023-08-31 DIAGNOSIS — M17.11 OSTEOARTHRITIS OF RIGHT KNEE, UNSPECIFIED OSTEOARTHRITIS TYPE: ICD-10-CM

## 2023-08-31 DIAGNOSIS — E78.2 MIXED HYPERLIPIDEMIA: ICD-10-CM

## 2023-08-31 DIAGNOSIS — Z01.818 PREOP EXAMINATION: Primary | ICD-10-CM

## 2023-08-31 DIAGNOSIS — D59.10 AUTOIMMUNE HEMOLYTIC ANEMIA (HCC): ICD-10-CM

## 2023-08-31 DIAGNOSIS — D50.9 IRON DEFICIENCY ANEMIA, UNSPECIFIED IRON DEFICIENCY ANEMIA TYPE: ICD-10-CM

## 2023-08-31 LAB
ATRIAL RATE: 70 BPM
P AXIS: 47 DEGREES
P-R INTERVAL: 192 MS
Q-T INTERVAL: 376 MS
QRS DURATION: 80 MS
QTC CALCULATION (BEZET): 406 MS
R AXIS: 67 DEGREES
T AXIS: 36 DEGREES
VENTRICULAR RATE: 70 BPM

## 2023-08-31 PROCEDURE — 87641 MR-STAPH DNA AMP PROBE: CPT

## 2023-08-31 PROCEDURE — 99214 OFFICE O/P EST MOD 30 MIN: CPT | Performed by: FAMILY MEDICINE

## 2023-08-31 PROCEDURE — 3008F BODY MASS INDEX DOCD: CPT | Performed by: FAMILY MEDICINE

## 2023-08-31 PROCEDURE — 93005 ELECTROCARDIOGRAM TRACING: CPT

## 2023-08-31 PROCEDURE — 3074F SYST BP LT 130 MM HG: CPT | Performed by: FAMILY MEDICINE

## 2023-08-31 PROCEDURE — 93010 ELECTROCARDIOGRAM REPORT: CPT | Performed by: INTERNAL MEDICINE

## 2023-08-31 PROCEDURE — 3078F DIAST BP <80 MM HG: CPT | Performed by: FAMILY MEDICINE

## 2023-08-31 RX ORDER — ZINC SULFATE 50(220)MG
220 CAPSULE ORAL DAILY
COMMUNITY

## 2023-09-01 LAB — MRSA DNA SPEC QL NAA+PROBE: NEGATIVE

## 2023-09-05 ENCOUNTER — MED REC SCAN ONLY (OUTPATIENT)
Facility: CLINIC | Age: 58
End: 2023-09-05

## 2023-09-28 ENCOUNTER — PATIENT MESSAGE (OUTPATIENT)
Facility: CLINIC | Age: 58
End: 2023-09-28

## 2023-11-02 ENCOUNTER — LAB ENCOUNTER (OUTPATIENT)
Dept: LAB | Facility: REFERENCE LAB | Age: 58
End: 2023-11-02
Attending: FAMILY MEDICINE
Payer: COMMERCIAL

## 2023-11-02 ENCOUNTER — OFFICE VISIT (OUTPATIENT)
Facility: CLINIC | Age: 58
End: 2023-11-02
Payer: COMMERCIAL

## 2023-11-02 VITALS
DIASTOLIC BLOOD PRESSURE: 80 MMHG | OXYGEN SATURATION: 97 % | SYSTOLIC BLOOD PRESSURE: 128 MMHG | HEIGHT: 61.5 IN | HEART RATE: 82 BPM | WEIGHT: 162 LBS | BODY MASS INDEX: 30.19 KG/M2

## 2023-11-02 DIAGNOSIS — M17.11 OSTEOARTHRITIS OF RIGHT KNEE, UNSPECIFIED OSTEOARTHRITIS TYPE: ICD-10-CM

## 2023-11-02 DIAGNOSIS — E78.2 MIXED HYPERLIPIDEMIA: ICD-10-CM

## 2023-11-02 DIAGNOSIS — D59.10 AUTOIMMUNE HEMOLYTIC ANEMIA (HCC): ICD-10-CM

## 2023-11-02 DIAGNOSIS — Z01.818 PREOP EXAMINATION: Primary | ICD-10-CM

## 2023-11-02 DIAGNOSIS — Z01.818 PREOP EXAMINATION: ICD-10-CM

## 2023-11-02 PROCEDURE — 90471 IMMUNIZATION ADMIN: CPT | Performed by: FAMILY MEDICINE

## 2023-11-02 PROCEDURE — 90686 IIV4 VACC NO PRSV 0.5 ML IM: CPT | Performed by: FAMILY MEDICINE

## 2023-11-02 PROCEDURE — 99214 OFFICE O/P EST MOD 30 MIN: CPT | Performed by: FAMILY MEDICINE

## 2023-11-02 PROCEDURE — 3079F DIAST BP 80-89 MM HG: CPT | Performed by: FAMILY MEDICINE

## 2023-11-02 PROCEDURE — 3008F BODY MASS INDEX DOCD: CPT | Performed by: FAMILY MEDICINE

## 2023-11-02 PROCEDURE — 3074F SYST BP LT 130 MM HG: CPT | Performed by: FAMILY MEDICINE

## 2023-11-02 PROCEDURE — 87641 MR-STAPH DNA AMP PROBE: CPT | Performed by: FAMILY MEDICINE

## 2023-11-03 LAB — MRSA DNA SPEC QL NAA+PROBE: NEGATIVE

## 2023-11-08 ENCOUNTER — PATIENT MESSAGE (OUTPATIENT)
Facility: CLINIC | Age: 58
End: 2023-11-08

## 2024-12-06 ENCOUNTER — PATIENT MESSAGE (OUTPATIENT)
Facility: CLINIC | Age: 59
End: 2024-12-06

## 2024-12-06 DIAGNOSIS — Z00.00 ROUTINE GENERAL MEDICAL EXAMINATION AT A HEALTH CARE FACILITY: Primary | ICD-10-CM

## 2024-12-06 DIAGNOSIS — R71.8 ELEVATED MCV: ICD-10-CM

## 2024-12-07 NOTE — TELEPHONE ENCOUNTER
Dr. Shields, please review my chart message. I haven't pended the blood test due to not knowing which you may prefer since she's had some completed outside [facility] not long ago. Thanks

## 2024-12-16 ENCOUNTER — LAB ENCOUNTER (OUTPATIENT)
Dept: LAB | Age: 59
End: 2024-12-16
Attending: FAMILY MEDICINE
Payer: COMMERCIAL

## 2024-12-16 DIAGNOSIS — Z00.00 ROUTINE GENERAL MEDICAL EXAMINATION AT A HEALTH CARE FACILITY: ICD-10-CM

## 2024-12-16 DIAGNOSIS — R71.8 ELEVATED MCV: ICD-10-CM

## 2024-12-16 LAB
CHOLEST SERPL-MCNC: 236 MG/DL (ref ?–200)
EST. AVERAGE GLUCOSE BLD GHB EST-MCNC: 114 MG/DL (ref 68–126)
FASTING PATIENT LIPID ANSWER: YES
HBA1C MFR BLD: 5.6 % (ref ?–5.7)
HDLC SERPL-MCNC: 66 MG/DL (ref 40–59)
LDLC SERPL CALC-MCNC: 161 MG/DL (ref ?–100)
NONHDLC SERPL-MCNC: 170 MG/DL (ref ?–130)
TRIGL SERPL-MCNC: 57 MG/DL (ref 30–149)
VIT B12 SERPL-MCNC: 822 PG/ML (ref 211–911)
VLDLC SERPL CALC-MCNC: 11 MG/DL (ref 0–30)

## 2024-12-16 PROCEDURE — 82607 VITAMIN B-12: CPT | Performed by: FAMILY MEDICINE

## 2024-12-16 PROCEDURE — 80061 LIPID PANEL: CPT | Performed by: FAMILY MEDICINE

## 2024-12-16 PROCEDURE — 83036 HEMOGLOBIN GLYCOSYLATED A1C: CPT | Performed by: FAMILY MEDICINE

## 2024-12-19 ENCOUNTER — OFFICE VISIT (OUTPATIENT)
Facility: CLINIC | Age: 59
End: 2024-12-19
Payer: COMMERCIAL

## 2024-12-19 VITALS
SYSTOLIC BLOOD PRESSURE: 116 MMHG | DIASTOLIC BLOOD PRESSURE: 70 MMHG | BODY MASS INDEX: 31.31 KG/M2 | WEIGHT: 168 LBS | HEIGHT: 61.5 IN | OXYGEN SATURATION: 99 % | HEART RATE: 75 BPM

## 2024-12-19 DIAGNOSIS — E78.2 MIXED HYPERLIPIDEMIA: ICD-10-CM

## 2024-12-19 DIAGNOSIS — Z00.00 ENCOUNTER FOR ROUTINE ADULT HEALTH EXAMINATION WITHOUT ABNORMAL FINDINGS: Primary | ICD-10-CM

## 2024-12-19 PROBLEM — C50.919 BREAST CANCER METASTASIZED TO BONE, UNSPECIFIED LATERALITY (HCC): Status: ACTIVE | Noted: 2024-03-28

## 2024-12-19 PROBLEM — C79.51 BREAST CANCER METASTASIZED TO BONE, UNSPECIFIED LATERALITY (HCC): Status: ACTIVE | Noted: 2024-03-28

## 2024-12-19 PROBLEM — C50.919 METASTATIC BREAST CANCER: Status: RESOLVED | Noted: 2022-05-03 | Resolved: 2024-12-19

## 2024-12-19 PROBLEM — C50.119 MALIGNANT NEOPLASM OF CENTRAL PORTION OF FEMALE BREAST (HCC): Status: RESOLVED | Noted: 2021-12-20 | Resolved: 2024-12-19

## 2024-12-19 RX ORDER — LAMOTRIGINE 25 MG/1
500 TABLET ORAL
COMMUNITY

## 2024-12-19 RX ORDER — PYRIDOXINE HCL (VITAMIN B6) 25 MG
20 TABLET ORAL DAILY
COMMUNITY

## 2024-12-19 NOTE — PROGRESS NOTES
HPI:   Hollie Mendez is a 59 year old female who presents for a complete physical exam.     Had a knee replacement last November, but had to stay two nights as she had some bleeding from her incision. Reports she did not get the results she had hoped for with the surgery, and she was going to PT four times a week. She can straighten her knee better now, but she is still having pain in her knee. She was losing weight from taking Tramadol after surgery, and when she stopped it she gained back some weight.     Last pap: 6/2024 and normal   Last mammogram: No longer needed   Previous colonoscopy: 11/2024 and normal   History of STD's: None  History of intimate partner violence: None  Family hx of breast, ovarian, cervical or colon CA: None  Diet and exercise: Has cut back on her intake of carbohydrates, and has tried to limit cheese as well. Will have a banana with a sliced of Ezekial bread with smashed avocado and a small amount of feta cheese. Has this with a cup of coffee and organic oat milk creamer. Lunch is dinner leftovers or will have a salad with tomatoes and homemade dressing. Dinner is chicken with potatoes or another vegetable. Has red meat occasionally, but not regularly. Will occasionally snack on popcorn after dinner or have some fruit. Drinks mostly water. Starting to do the exercise bike again at home.   Immunizations:  Tdap: 2021, Flu: Would like it today, Pneumo: 2021, Shingles: Completed, Covid: Completed 5 doses    REVIEW OF SYSTEMS:   GENERAL: feels well otherwise   SKIN: denies any unusual skin lesions  EYES: no vision problems  BREAST: no lumps or masses, no nipple discharge   LUNGS: denies shortness of breath  CARDIOVASCULAR: denies chest pain  GI: denies abdominal pain,  No constipation or diarrhea, no hematochezia  : denies dysuria, vaginal discharge or itching  NEURO: denies headaches  PSYCHE: denies depression or anxiety          Current Outpatient Medications   Medication Sig Dispense  Refill    Pyridoxine HCl 25 MG Oral Tab Take 20 mg by mouth daily.      fulvestrant 250 mg/5mL Intramuscular injection Inject 10 mL (500 mg total) into the muscle.      zinc sulfate 220 (50 Zn) MG Oral Cap Take 1 capsule (220 mg total) by mouth daily.      IBRANCE 100 MG Oral Tab       Vitamin D3, Cholecalciferol, 50 MCG (2000 UT) Oral Tab Take 2 tablets (4,000 Units total) by mouth daily.      magnesium 250 MG Oral Tab Take 1 tablet (250 mg total) by mouth in the morning and 1 tablet (250 mg total) before bedtime.      cyanocobalamin 1000 MCG Oral Tab Take 1 tablet (1,000 mcg total) by mouth daily.       Allergies[1]   Past Medical History:    History of esophagogastroduodenoscopy (EGD)    normal save gastric polyp, H pylori neg.      Past Surgical History:   Procedure Laterality Date    Egd  09/06/2021    Ir ivc filter placement  08/30/2021    Mastectomy,simple Bilateral 09/2022    Splenectomy  08/31/2021    Tonsillectomy        Family History   Problem Relation Age of Onset    Heart Disorder Father     Lipids Father     Diabetes Mother     Hypertension Mother       Social History:   Social History     Socioeconomic History    Marital status:    Tobacco Use    Smoking status: Never    Smokeless tobacco: Never   Vaping Use    Vaping status: Never Used   Substance and Sexual Activity    Alcohol use: Not Currently     Alcohol/week: 0.0 standard drinks of alcohol     Comment: socially    Drug use: Never     Social Drivers of Health     Financial Resource Strain: Low Risk  (11/27/2023)    Received from [facility]    Overall Financial Resource Strain (CARDIA)     Difficulty of Paying Living Expenses: Not hard at all   Food Insecurity: No Food Insecurity (11/27/2023)    Received from [facility]    Hunger Vital Sign     Worried About Running Out of Food in the Last Year: Never true     Ran Out of Food in the Last Year: Never true   Transportation Needs: No  Transportation Needs (11/27/2023)    Received from [facility]    PRAPARE - Transportation     Lack of Transportation (Medical): No     Lack of Transportation (Non-Medical): No   Housing Stability: Low Risk  (2/9/2024)    Received from [facility]     What is your housing situation today?: I have housing     How many times have you moved in the past 12 months?: Zero (I did not move)     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 : Yes. Children: 2 daughters.         EXAM:     Wt Readings from Last 6 Encounters:   11/02/23 162 lb (73.5 kg)   08/31/23 162 lb (73.5 kg)   05/05/23 157 lb (71.2 kg)   02/02/23 159 lb (72.1 kg)   01/05/23 157 lb (71.2 kg)   12/06/22 152 lb (68.9 kg)     Body mass index is 30.11 kg/m².   /70   Pulse 75   Ht 5' 1.5\" (1.562 m)   LMP 11/02/2016   SpO2 99%   BMI 30.11 kg/m²     GENERAL: well developed, well nourished, in no apparent distress   SKIN: no rashes, no suspicious lesions  HEENT: atraumatic, normocephalic, throat clear; normal dentition  EYES: PERRLA, EOMI, conjunctiva are clear  NECK: supple, no adenopathy or thyroid masses   BREAST: no dominant or suspicious mass, no nipple discharge  LUNGS: clear to auscultation  CARDIO: RRR without murmur  GI: good bowel sounds, no masses, HSM or tenderness  : deferred, sees gynecologist   EXTREMITIES: no edema    Cholesterol, Total (mg/dL)   Date Value   12/16/2024 236 (H)   04/29/2022 269 (H)   11/11/2020 283 (H)     HDL Cholesterol (mg/dL)   Date Value   12/16/2024 66 (H)   04/29/2022 77 (H)   11/11/2020 73 (H)     LDL Cholesterol (mg/dL)   Date Value   12/16/2024 161 (H)   04/29/2022 182 (H)   11/11/2020 189 (H)      ASSESSMENT AND PLAN:   Hollie Mendez is a 59 year old female who presents for a complete physical exam.  Encounter Diagnoses   Name Primary?    Encounter for routine adult health examination without abnormal  findings Yes    Mixed hyperlipidemia      Orders Placed This Encounter   Procedures    INFLUENZA VACCINE, TRI, PRESERV FREE, 0.5 ML     Continue healthy diet and regular exercise for hyperlipidemia, and will also repeat heart scan as last one was done in January 2021.  Declines statin at this time.    Discussed with patient the following:  -Monthly breast self-exam  -Breast cancer screening/mammograms and clinical breast exams  -Cervical cancer screening/pap smears  -Colon cancer screening/colonoscopy  -Adequate calcium and Vitamin D intake to prevent osteoporosis  -Healthy diet including adequate intake of vegetables and fruits, appropriate portion sizes, minimizing highly concentrated carbohydrate foods  -Exercising 30 minutes a day most days of the week   -Diabetes screening   -Cholesterol screening  -Recommendation for yearly influenza vaccine  -Need for Tdap once as an adult and Td booster every 10 years  -Need for Zoster vaccine for patients >= 50  -STI screening (GC/Chlamydia/HIV): Not indicated  -Hepatitis C screening for all adults between the ages of 18 and 79: Checked and negative     All questions were answered during the visit and the patient verbalizes understanding. She will return in one year for next WWE or sooner as needed.    Meds & Refills for this Visit:  Requested Prescriptions      No prescriptions requested or ordered in this encounter       Imaging & Consults:  INFLUENZA VACCINE, TRI, PRESERV FREE, 0.5 ML    Tamela Shields DO  12/19/2024  2:46 PM         [1]   Allergies  Allergen Reactions    Quinolones     Sulfa Antibiotics OTHER (SEE COMMENTS)     rash

## 2025-01-23 ENCOUNTER — NURSE TRIAGE (OUTPATIENT)
Facility: CLINIC | Age: 60
End: 2025-01-23

## 2025-01-23 NOTE — TELEPHONE ENCOUNTER
Action Requested: Summary for Provider     []  Critical Lab, Recommendations Needed  [] Need Additional Advice  []   FYI    []   Need Orders  [] Need Medications Sent to Pharmacy  []  Other     SUMMARY: ,Disposition per protocol  is go to office now.  No appointments available at [facility] today or tomorrow.  Patient will go to [facility]    Reason for call: Cough  Onset: Monday     Patient calling,verified name and date of birth.  Fever, body aches, post nasal drip and  dry cough since Monday. Temperature 101.2 last night, 99.6 this morning. Taking tylenol as needed for fever and body aches. Home covid test negative Monday and Wednesday. Currently on Ibrance,  oncology advised her to stop it on Monday due to illness. She denies chest pains, weakness, shortness of breath. Reviewed care advice to continue to push fluids, humidifier/steam, over the counter pain relievers for pain and fever, over the counter cough syrup/cough drops,rest, all back for uncontrolled fever, worsening cough or if new symptoms such as chest pain or shortness of breath develop. Patient verbalizes understanding and agrees to plan of care.    Reason for Disposition   Fever > 100.0 F (37.8 C) and has diabetes mellitus or a weak immune system (e.g., HIV positive, cancer chemotherapy, organ transplant, splenectomy, chronic steroids)    Protocols used: Cough-A-OH

## 2025-01-24 ENCOUNTER — APPOINTMENT (OUTPATIENT)
Dept: GENERAL RADIOLOGY | Age: 60
End: 2025-01-24
Attending: NURSE PRACTITIONER
Payer: COMMERCIAL

## 2025-01-24 ENCOUNTER — HOSPITAL ENCOUNTER (OUTPATIENT)
Age: 60
Discharge: HOME | End: 2025-01-24
Payer: COMMERCIAL

## 2025-01-24 VITALS
SYSTOLIC BLOOD PRESSURE: 92 MMHG | OXYGEN SATURATION: 97 % | RESPIRATION RATE: 20 BRPM | TEMPERATURE: 99 F | DIASTOLIC BLOOD PRESSURE: 57 MMHG | HEART RATE: 91 BPM

## 2025-01-24 DIAGNOSIS — R50.9 FEVER: ICD-10-CM

## 2025-01-24 DIAGNOSIS — J11.1 INFLUENZA: Primary | ICD-10-CM

## 2025-01-24 DIAGNOSIS — R05.1 ACUTE COUGH: ICD-10-CM

## 2025-01-24 LAB
POCT INFLUENZA A: POSITIVE
POCT INFLUENZA B: NEGATIVE

## 2025-01-24 PROCEDURE — 71046 X-RAY EXAM CHEST 2 VIEWS: CPT | Performed by: NURSE PRACTITIONER

## 2025-01-24 PROCEDURE — 99204 OFFICE O/P NEW MOD 45 MIN: CPT | Performed by: NURSE PRACTITIONER

## 2025-01-24 PROCEDURE — 87502 INFLUENZA DNA AMP PROBE: CPT | Performed by: NURSE PRACTITIONER

## 2025-01-24 RX ORDER — BENZONATATE 200 MG/1
200 CAPSULE ORAL 3 TIMES DAILY PRN
Qty: 15 CAPSULE | Refills: 0 | Status: SHIPPED | OUTPATIENT
Start: 2025-01-24

## 2025-01-24 NOTE — ED PROVIDER NOTES
Patient Seen in: [facility]      History     Chief Complaint   Patient presents with    Fever     Cold cough fever since Monday - Entered by patient     Stated Complaint: Fever - Cold cough fever since Monday    Subjective:   HPI  Patient is an 59-year-old female with remote history of breast cancer (taking Ibrance).  She presents to the immediate care center today with concern for low-grade fever and myalgia that started 5 days ago. She developed cough 2 days ago. Pt denies known illness exposure.  Pt. has been eating and drinking without difficulty.  She has had no shortness of air; no abdominal or back pain; no headache or dizziness.          Objective:     Past Medical History:    History of esophagogastroduodenoscopy (EGD)    normal save gastric polyp, H pylori neg.              Past Surgical History:   Procedure Laterality Date    Egd  09/06/2021    Ir ivc filter placement  08/30/2021    Mastectomy,simple Bilateral 09/2022    Splenectomy  08/31/2021    Tonsillectomy      Total knee arthroplasty Right 11/2023                Social History     Socioeconomic History    Marital status:    Tobacco Use    Smoking status: Never    Smokeless tobacco: Never   Vaping Use    Vaping status: Never Used   Substance and Sexual Activity    Alcohol use: Not Currently     Alcohol/week: 0.0 standard drinks of alcohol     Comment: socially    Drug use: Never     Social Drivers of Health     Financial Resource Strain: Low Risk  (11/27/2023)    Received from [facility]    Overall Financial Resource Strain (CARDIA)     Difficulty of Paying Living Expenses: Not hard at all   Food Insecurity: No Food Insecurity (11/27/2023)    Received from [facility]    Hunger Vital Sign     Worried About Running Out of Food in the Last Year: Never true     Ran Out of Food in the Last Year: Never true   Transportation Needs: No Transportation Needs (11/27/2023)    Received from  [facility]    PRAPARE - Transportation     Lack of Transportation (Medical): No     Lack of Transportation (Non-Medical): No   Housing Stability: Low Risk  (2/9/2024)    Received from [facility]     What is your housing situation today?: I have housing     How many times have you moved in the past 12 months?: Zero (I did not move)              Review of Systems   Constitutional:  Positive for fever. Negative for appetite change and chills.   HENT:  Positive for congestion and postnasal drip. Negative for sinus pressure.    Respiratory:  Positive for cough. Negative for shortness of breath.    Cardiovascular:  Negative for chest pain.   Gastrointestinal:  Negative for abdominal pain.   Musculoskeletal:  Positive for myalgias. Negative for arthralgias.   Skin:  Negative for rash.   Neurological:  Negative for dizziness, weakness and headaches.       Positive for stated complaint: Fever - Cold cough fever since Monday  Other systems are as noted in HPI.  Constitutional and vital signs reviewed.      All other systems reviewed and negative except as noted above.    Physical Exam     ED Triage Vitals [01/24/25 0850]   BP 92/57   Pulse 91   Resp 20   Temp 99.4 °F (37.4 °C)   Temp src Oral   SpO2 97 %   O2 Device None (Room air)       Current Vitals:   Vital Signs  BP: 92/57  Pulse: 91  Resp: 20  Temp: 99.4 °F (37.4 °C)  Temp src: Oral    Oxygen Therapy  SpO2: 97 %  O2 Device: None (Room air)        Physical Exam  Vitals and nursing note reviewed.   Constitutional:       General: She is not in acute distress.     Appearance: She is not ill-appearing.   HENT:      Head: Normocephalic and atraumatic.      Nose: Nose normal.   Eyes:      Conjunctiva/sclera: Conjunctivae normal.   Pulmonary:      Effort: Pulmonary effort is normal. No respiratory distress.      Breath sounds: Normal breath sounds.   Musculoskeletal:      Cervical back: Normal range of motion and neck  supple.   Skin:     General: Skin is warm and dry.      Findings: No rash.   Neurological:      Mental Status: She is alert and oriented to person, place, and time.             ED Course     Labs Reviewed   POCT FLU TEST - Abnormal; Notable for the following components:       Result Value    POCT INFLUENZA A Positive (*)     All other components within normal limits    Narrative:     This assay is a rapid molecular in vitro test utilizing nucleic acid amplification of influenza A and B viral RNA.     XR CHEST PA + LAT CHEST (CPT=71046)   Final Result   PROCEDURE: XR CHEST PA + LAT CHEST (CPT=71046)       COMPARISON: [facility] in [place], XR CHEST PA + LAT    CHEST (CPT=71046), 3/20/2020, 11:16 AM.  [facility], XR    CHEST PA + LAT CHEST (CPT=71020), 12/13/2016, 12:23 PM.       INDICATIONS: Cough for 2 days. Fever for 4 days.       TECHNIQUE:   Two views.         FINDINGS:    CARDIAC/VASC: Normal.  No cardiac silhouette abnormality or cardiomegaly.     Unremarkable pulmonary vasculature.     MEDIAST/SUSAN: No visible mass or adenopathy.    LUNGS/PLEURA: Normal.  No significant pulmonary parenchymal abnormalities.     No effusion or pleural thickening.     BONES: No fracture or visible bony lesion.    OTHER: Negative.                     =====   CONCLUSION: No radiographic evidence of acute cardiopulmonary abnormality.               Dictated by (CST): Adan Savage MD on 1/24/2025 at 9:46 AM        Finalized by (CST): Adan Savage MD on 1/24/2025 at 9:47 AM                               MDM              Medical Decision Making  Differential diagnoses considered included, but are not exclusive of: bacterial vs viral sinusitis, dehydration, pneumonia, influenza, Covid-19 infection, and other viral upper respiratory infection.       Problems Addressed:  Influenza: self-limited or minor problem    Amount and/or Complexity of Data Reviewed  Labs:  Decision-making details documented in ED  Course.  Radiology:  Decision-making details documented in ED Course.    Risk  OTC drugs.  Prescription drug management.        Disposition and Plan     Clinical Impression:  1. Influenza    2. Fever    3. Acute cough         Disposition:  Discharge  1/24/2025  9:52 am    Follow-up:  No follow-up provider specified.        Medications Prescribed:  Discharge Medication List as of 1/24/2025  9:57 AM        START taking these medications    Details   benzonatate 200 MG Oral Cap Take 1 capsule (200 mg total) by mouth 3 (three) times daily as needed for cough., Normal, Disp-15 capsule, R-0                 Supplementary Documentation:

## 2025-01-24 NOTE — ED INITIAL ASSESSMENT (HPI)
Pt states Monday began having fevers, pt states most days starts with low grade and then rises around the evening. Pt states developed a cough about 2 days ago. Pt states having post nasal drip.

## 2025-02-28 ENCOUNTER — ORDER TRANSCRIPTION (OUTPATIENT)
Dept: ADMINISTRATIVE | Facility: HOSPITAL | Age: 60
End: 2025-02-28

## 2025-02-28 DIAGNOSIS — Z13.6 SCREENING FOR CARDIOVASCULAR CONDITION: Primary | ICD-10-CM

## 2025-03-20 ENCOUNTER — HOSPITAL ENCOUNTER (OUTPATIENT)
Dept: CT IMAGING | Facility: HOSPITAL | Age: 60
Discharge: HOME | End: 2025-03-20
Attending: FAMILY MEDICINE

## 2025-03-20 DIAGNOSIS — Z13.6 SCREENING FOR CARDIOVASCULAR CONDITION: ICD-10-CM

## 2025-04-15 ENCOUNTER — PATIENT MESSAGE (OUTPATIENT)
Facility: CLINIC | Age: 60
End: 2025-04-15

## (undated) DIAGNOSIS — Z00.00 ROUTINE PHYSICAL EXAMINATION: Primary | ICD-10-CM